# Patient Record
Sex: FEMALE | Race: WHITE | NOT HISPANIC OR LATINO | Employment: OTHER | URBAN - METROPOLITAN AREA
[De-identification: names, ages, dates, MRNs, and addresses within clinical notes are randomized per-mention and may not be internally consistent; named-entity substitution may affect disease eponyms.]

---

## 2019-04-01 ENCOUNTER — OFFICE VISIT CONVERTED (OUTPATIENT)
Dept: OTHER | Facility: HOSPITAL | Age: 84
End: 2019-04-01
Attending: NURSE PRACTITIONER

## 2019-04-01 ENCOUNTER — CONVERSION ENCOUNTER (OUTPATIENT)
Dept: OTHER | Facility: HOSPITAL | Age: 84
End: 2019-04-01

## 2019-04-01 ENCOUNTER — HOSPITAL ENCOUNTER (OUTPATIENT)
Dept: OTHER | Facility: HOSPITAL | Age: 84
Discharge: HOME | End: 2019-04-01
Attending: NURSE PRACTITIONER

## 2019-04-01 LAB
25(OH)D3 SERPL-MCNC: 34.7 NG/ML (ref 30–100)
ALBUMIN SERPL-MCNC: 4.4 G/DL (ref 3.5–5)
ALBUMIN/GLOB SERPL: 1.5 {RATIO} (ref 1.4–2.6)
ALP SERPL-CCNC: 60 U/L (ref 43–160)
ALT SERPL-CCNC: 11 U/L (ref 10–40)
ANION GAP SERPL CALC-SCNC: 16 MMOL/L (ref 8–19)
AST SERPL-CCNC: 19 U/L (ref 15–50)
BASOPHILS # BLD AUTO: 0.03 10*3/UL (ref 0–0.2)
BASOPHILS NFR BLD AUTO: 0.4 % (ref 0–3)
BILIRUB SERPL-MCNC: 0.58 MG/DL (ref 0.2–1.3)
BUN SERPL-MCNC: 14 MG/DL (ref 5–25)
BUN/CREAT SERPL: 16 {RATIO} (ref 6–20)
CALCIUM SERPL-MCNC: 9 MG/DL (ref 8.7–10.4)
CHLORIDE SERPL-SCNC: 104 MMOL/L (ref 99–111)
CHOLEST SERPL-MCNC: 169 MG/DL (ref 107–200)
CHOLEST/HDLC SERPL: 3.1 {RATIO} (ref 3–6)
CONV ABS IMM GRAN: 0.02 10*3/UL (ref 0–0.2)
CONV CO2: 26 MMOL/L (ref 22–32)
CONV IMMATURE GRAN: 0.3 % (ref 0–1.8)
CONV TOTAL PROTEIN: 7.4 G/DL (ref 6.3–8.2)
CREAT UR-MCNC: 0.88 MG/DL (ref 0.5–0.9)
DEPRECATED RDW RBC AUTO: 42 FL (ref 36.4–46.3)
EOSINOPHIL # BLD AUTO: 0.08 10*3/UL (ref 0–0.7)
EOSINOPHIL # BLD AUTO: 1.2 % (ref 0–7)
ERYTHROCYTE [DISTWIDTH] IN BLOOD BY AUTOMATED COUNT: 12.9 % (ref 11.7–14.4)
FOLATE SERPL-MCNC: >20 NG/ML (ref 4.8–20)
GFR SERPLBLD BASED ON 1.73 SQ M-ARVRAT: 59 ML/MIN/{1.73_M2}
GLOBULIN UR ELPH-MCNC: 3 G/DL (ref 2–3.5)
GLUCOSE SERPL-MCNC: 90 MG/DL (ref 65–99)
HBA1C MFR BLD: 14.2 G/DL (ref 12–16)
HCT VFR BLD AUTO: 44.9 % (ref 37–47)
HDLC SERPL-MCNC: 54 MG/DL (ref 40–60)
LDLC SERPL CALC-MCNC: 97 MG/DL (ref 70–100)
LYMPHOCYTES # BLD AUTO: 1.86 10*3/UL (ref 1–5)
MCH RBC QN AUTO: 28 PG (ref 27–31)
MCHC RBC AUTO-ENTMCNC: 31.6 G/DL (ref 33–37)
MCV RBC AUTO: 88.6 FL (ref 81–99)
MONOCYTES # BLD AUTO: 0.56 10*3/UL (ref 0.2–1.2)
MONOCYTES NFR BLD AUTO: 8.4 % (ref 3–10)
NEUTROPHILS # BLD AUTO: 4.14 10*3/UL (ref 2–8)
NEUTROPHILS NFR BLD AUTO: 61.9 % (ref 30–85)
NRBC CBCN: 0 % (ref 0–0.7)
OSMOLALITY SERPL CALC.SUM OF ELEC: 294 MOSM/KG (ref 273–304)
PLATELET # BLD AUTO: 106 10*3/UL (ref 130–400)
PMV BLD AUTO: 13.2 FL (ref 9.4–12.3)
POTASSIUM SERPL-SCNC: 4.2 MMOL/L (ref 3.5–5.3)
RBC # BLD AUTO: 5.07 10*6/UL (ref 4.2–5.4)
SODIUM SERPL-SCNC: 142 MMOL/L (ref 135–147)
TRIGL SERPL-MCNC: 88 MG/DL (ref 40–150)
TSH SERPL-ACNC: 1.54 M[IU]/L (ref 0.27–4.2)
VARIANT LYMPHS NFR BLD MANUAL: 27.8 % (ref 20–45)
VIT B12 SERPL-MCNC: 1569 PG/ML (ref 211–911)
VLDLC SERPL-MCNC: 18 MG/DL (ref 5–37)
WBC # BLD AUTO: 6.69 10*3/UL (ref 4.8–10.8)

## 2019-04-23 ENCOUNTER — OFFICE VISIT CONVERTED (OUTPATIENT)
Dept: OTHER | Facility: HOSPITAL | Age: 84
End: 2019-04-23
Attending: NURSE PRACTITIONER

## 2019-04-23 ENCOUNTER — CONVERSION ENCOUNTER (OUTPATIENT)
Dept: OTHER | Facility: HOSPITAL | Age: 84
End: 2019-04-23

## 2019-05-21 ENCOUNTER — OFFICE VISIT CONVERTED (OUTPATIENT)
Dept: OTHER | Facility: HOSPITAL | Age: 84
End: 2019-05-21
Attending: NURSE PRACTITIONER

## 2019-05-21 ENCOUNTER — CONVERSION ENCOUNTER (OUTPATIENT)
Dept: OTHER | Facility: HOSPITAL | Age: 84
End: 2019-05-21

## 2021-05-15 VITALS
RESPIRATION RATE: 16 BRPM | SYSTOLIC BLOOD PRESSURE: 151 MMHG | TEMPERATURE: 98 F | BODY MASS INDEX: 23.51 KG/M2 | WEIGHT: 104.5 LBS | HEIGHT: 56 IN | DIASTOLIC BLOOD PRESSURE: 73 MMHG | HEART RATE: 90 BPM | OXYGEN SATURATION: 98 %

## 2021-05-15 VITALS
BODY MASS INDEX: 23.9 KG/M2 | SYSTOLIC BLOOD PRESSURE: 151 MMHG | WEIGHT: 106.25 LBS | HEIGHT: 56 IN | OXYGEN SATURATION: 99 % | RESPIRATION RATE: 16 BRPM | TEMPERATURE: 97.9 F | HEART RATE: 70 BPM | DIASTOLIC BLOOD PRESSURE: 74 MMHG

## 2021-05-15 VITALS
RESPIRATION RATE: 16 BRPM | TEMPERATURE: 98.5 F | BODY MASS INDEX: 23.94 KG/M2 | OXYGEN SATURATION: 95 % | WEIGHT: 106.44 LBS | DIASTOLIC BLOOD PRESSURE: 80 MMHG | SYSTOLIC BLOOD PRESSURE: 186 MMHG | HEIGHT: 56 IN | HEART RATE: 84 BPM

## 2022-12-21 ENCOUNTER — HOSPITAL ENCOUNTER (EMERGENCY)
Facility: HOSPITAL | Age: 87
Discharge: HOME | End: 2022-12-21
Attending: EMERGENCY MEDICINE | Admitting: EMERGENCY MEDICINE

## 2022-12-21 ENCOUNTER — APPOINTMENT (OUTPATIENT)
Dept: CARDIOLOGY | Facility: HOSPITAL | Age: 87
End: 2022-12-21

## 2022-12-21 VITALS
OXYGEN SATURATION: 100 % | SYSTOLIC BLOOD PRESSURE: 170 MMHG | WEIGHT: 87.52 LBS | BODY MASS INDEX: 17.64 KG/M2 | TEMPERATURE: 98 F | DIASTOLIC BLOOD PRESSURE: 60 MMHG | HEIGHT: 59 IN | HEART RATE: 79 BPM | RESPIRATION RATE: 14 BRPM

## 2022-12-21 DIAGNOSIS — L03.116 CELLULITIS OF LEFT LEG: Primary | ICD-10-CM

## 2022-12-21 LAB
ALBUMIN SERPL-MCNC: 4.6 G/DL (ref 3.5–5.2)
ALBUMIN/GLOB SERPL: 1.6 G/DL
ALP SERPL-CCNC: 86 U/L (ref 39–117)
ALT SERPL W P-5'-P-CCNC: 7 U/L (ref 1–33)
ANION GAP SERPL CALCULATED.3IONS-SCNC: 11.7 MMOL/L (ref 5–15)
AST SERPL-CCNC: 17 U/L (ref 1–32)
BASOPHILS # BLD AUTO: 0.02 10*3/MM3 (ref 0–0.2)
BASOPHILS NFR BLD AUTO: 0.3 % (ref 0–1.5)
BH CV LOWER VASCULAR LEFT COMMON FEMORAL AUGMENT: NORMAL
BH CV LOWER VASCULAR LEFT COMMON FEMORAL COMPETENT: NORMAL
BH CV LOWER VASCULAR LEFT COMMON FEMORAL COMPRESS: NORMAL
BH CV LOWER VASCULAR LEFT COMMON FEMORAL PHASIC: NORMAL
BH CV LOWER VASCULAR LEFT COMMON FEMORAL SPONT: NORMAL
BH CV LOWER VASCULAR LEFT DISTAL FEMORAL AUGMENT: NORMAL
BH CV LOWER VASCULAR LEFT DISTAL FEMORAL COMPETENT: NORMAL
BH CV LOWER VASCULAR LEFT DISTAL FEMORAL COMPRESS: NORMAL
BH CV LOWER VASCULAR LEFT DISTAL FEMORAL PHASIC: NORMAL
BH CV LOWER VASCULAR LEFT DISTAL FEMORAL SPONT: NORMAL
BH CV LOWER VASCULAR LEFT GASTRONEMIUS COMPRESS: NORMAL
BH CV LOWER VASCULAR LEFT GREATER SAPH AK COMPRESS: NORMAL
BH CV LOWER VASCULAR LEFT GREATER SAPH BK COMPRESS: NORMAL
BH CV LOWER VASCULAR LEFT LESSER SAPH COMPRESS: NORMAL
BH CV LOWER VASCULAR LEFT MID FEMORAL COMPRESS: NORMAL
BH CV LOWER VASCULAR LEFT PERONEAL COMPRESS: NORMAL
BH CV LOWER VASCULAR LEFT POPLITEAL AUGMENT: NORMAL
BH CV LOWER VASCULAR LEFT POPLITEAL COMPETENT: NORMAL
BH CV LOWER VASCULAR LEFT POPLITEAL COMPRESS: NORMAL
BH CV LOWER VASCULAR LEFT POPLITEAL PHASIC: NORMAL
BH CV LOWER VASCULAR LEFT POPLITEAL SPONT: NORMAL
BH CV LOWER VASCULAR LEFT POSTERIOR TIBIAL AUGMENT: NORMAL
BH CV LOWER VASCULAR LEFT POSTERIOR TIBIAL COMPRESS: NORMAL
BH CV LOWER VASCULAR LEFT PROXIMAL FEMORAL COMPRESS: NORMAL
BH CV LOWER VASCULAR RIGHT COMMON FEMORAL AUGMENT: NORMAL
BH CV LOWER VASCULAR RIGHT COMMON FEMORAL COMPETENT: NORMAL
BH CV LOWER VASCULAR RIGHT COMMON FEMORAL COMPRESS: NORMAL
BH CV LOWER VASCULAR RIGHT COMMON FEMORAL PHASIC: NORMAL
BH CV LOWER VASCULAR RIGHT COMMON FEMORAL SPONT: NORMAL
BH CV VAS PRELIMINARY FINDINGS SCRIPTING: 1
BILIRUB SERPL-MCNC: 0.3 MG/DL (ref 0–1.2)
BUN SERPL-MCNC: 20 MG/DL (ref 8–23)
BUN/CREAT SERPL: 17.5 (ref 7–25)
CALCIUM SPEC-SCNC: 9.4 MG/DL (ref 8.6–10.5)
CHLORIDE SERPL-SCNC: 106 MMOL/L (ref 98–107)
CO2 SERPL-SCNC: 24.3 MMOL/L (ref 22–29)
CREAT SERPL-MCNC: 1.14 MG/DL (ref 0.57–1)
DEPRECATED RDW RBC AUTO: 43.2 FL (ref 37–54)
EGFRCR SERPLBLD CKD-EPI 2021: 46.1 ML/MIN/1.73
EOSINOPHIL # BLD AUTO: 0.13 10*3/MM3 (ref 0–0.4)
EOSINOPHIL NFR BLD AUTO: 2.3 % (ref 0.3–6.2)
ERYTHROCYTE [DISTWIDTH] IN BLOOD BY AUTOMATED COUNT: 13.2 % (ref 12.3–15.4)
GLOBULIN UR ELPH-MCNC: 2.9 GM/DL
GLUCOSE SERPL-MCNC: 113 MG/DL (ref 65–99)
HCT VFR BLD AUTO: 42.7 % (ref 34–46.6)
HGB BLD-MCNC: 13.9 G/DL (ref 12–15.9)
HOLD SPECIMEN: NORMAL
HOLD SPECIMEN: NORMAL
IMM GRANULOCYTES # BLD AUTO: 0.01 10*3/MM3 (ref 0–0.05)
IMM GRANULOCYTES NFR BLD AUTO: 0.2 % (ref 0–0.5)
LYMPHOCYTES # BLD AUTO: 1.46 10*3/MM3 (ref 0.7–3.1)
LYMPHOCYTES NFR BLD AUTO: 25.3 % (ref 19.6–45.3)
MAXIMAL PREDICTED HEART RATE: 131 BPM
MCH RBC QN AUTO: 28.7 PG (ref 26.6–33)
MCHC RBC AUTO-ENTMCNC: 32.6 G/DL (ref 31.5–35.7)
MCV RBC AUTO: 88.2 FL (ref 79–97)
MONOCYTES # BLD AUTO: 0.37 10*3/MM3 (ref 0.1–0.9)
MONOCYTES NFR BLD AUTO: 6.4 % (ref 5–12)
NEUTROPHILS NFR BLD AUTO: 3.78 10*3/MM3 (ref 1.7–7)
NEUTROPHILS NFR BLD AUTO: 65.5 % (ref 42.7–76)
NRBC BLD AUTO-RTO: 0 /100 WBC (ref 0–0.2)
PLATELET # BLD AUTO: 150 10*3/MM3 (ref 140–450)
PMV BLD AUTO: 11.7 FL (ref 6–12)
POTASSIUM SERPL-SCNC: 4.6 MMOL/L (ref 3.5–5.2)
PROT SERPL-MCNC: 7.5 G/DL (ref 6–8.5)
RBC # BLD AUTO: 4.84 10*6/MM3 (ref 3.77–5.28)
SODIUM SERPL-SCNC: 142 MMOL/L (ref 136–145)
STRESS TARGET HR: 111 BPM
WBC NRBC COR # BLD: 5.77 10*3/MM3 (ref 3.4–10.8)
WHOLE BLOOD HOLD COAG: NORMAL
WHOLE BLOOD HOLD SPECIMEN: NORMAL

## 2022-12-21 PROCEDURE — 25010000002 CEFAZOLIN 1-4 GM/50ML-% SOLUTION: Performed by: EMERGENCY MEDICINE

## 2022-12-21 PROCEDURE — 99283 EMERGENCY DEPT VISIT LOW MDM: CPT

## 2022-12-21 PROCEDURE — 96365 THER/PROPH/DIAG IV INF INIT: CPT

## 2022-12-21 PROCEDURE — 80053 COMPREHEN METABOLIC PANEL: CPT | Performed by: EMERGENCY MEDICINE

## 2022-12-21 PROCEDURE — 85025 COMPLETE CBC W/AUTO DIFF WBC: CPT | Performed by: EMERGENCY MEDICINE

## 2022-12-21 PROCEDURE — 93971 EXTREMITY STUDY: CPT

## 2022-12-21 PROCEDURE — 93971 EXTREMITY STUDY: CPT | Performed by: SURGERY

## 2022-12-21 PROCEDURE — 87040 BLOOD CULTURE FOR BACTERIA: CPT | Performed by: EMERGENCY MEDICINE

## 2022-12-21 RX ORDER — CEPHALEXIN 500 MG/1
500 CAPSULE ORAL 3 TIMES DAILY
Qty: 21 CAPSULE | Refills: 0 | Status: SHIPPED | OUTPATIENT
Start: 2022-12-21

## 2022-12-21 RX ORDER — OMEPRAZOLE 20 MG/1
20 CAPSULE, DELAYED RELEASE ORAL DAILY
COMMUNITY

## 2022-12-21 RX ORDER — CEFAZOLIN SODIUM 1 G/50ML
1 INJECTION, SOLUTION INTRAVENOUS ONCE
Status: COMPLETED | OUTPATIENT
Start: 2022-12-21 | End: 2022-12-21

## 2022-12-21 RX ORDER — SODIUM CHLORIDE 0.9 % (FLUSH) 0.9 %
10 SYRINGE (ML) INJECTION AS NEEDED
Status: DISCONTINUED | OUTPATIENT
Start: 2022-12-21 | End: 2022-12-21 | Stop reason: HOSPADM

## 2022-12-21 RX ADMIN — CEFAZOLIN SODIUM 1 G: 1 INJECTION, SOLUTION INTRAVENOUS at 20:01

## 2022-12-21 NOTE — ED PROVIDER NOTES
"Time: 5:09 PM EST  Arrived by: private car  Chief Complaint: Leg Swelling   History provided by: spouse  History is limited by: N/A     History of Present Illness:  Patient is a 89 y.o. year old female who presents to the emergency department with concerns for left leg infection.    Patient's  states the patient has been having problems with her left leg for 3 years, but it has been much worsened in the past 10 days. They have tried Epsom salt soaks and applying Neosporin at home.               Patient Care Team  Primary Care Provider: Provider, No Known    Past Medical History:     No Known Allergies  Past Medical History:   Diagnosis Date   • GERD (gastroesophageal reflux disease)      History reviewed. No pertinent surgical history.  History reviewed. No pertinent family history.    Home Medications:  Prior to Admission medications    Medication Sig Start Date End Date Taking? Authorizing Provider   omeprazole (priLOSEC) 20 MG capsule Take 20 mg by mouth Daily.   Yes Provider, MD Shania        Social History:   Social History     Tobacco Use   • Smoking status: Never   • Smokeless tobacco: Never   Substance Use Topics   • Alcohol use: Never   • Drug use: Never     Recent travel: no     Review of Systems:  Review of Systems     Physical Exam:  /60   Pulse 68   Temp 98 °F (36.7 °C) (Oral)   Resp 14   Ht 149.9 cm (59\")   Wt 39.7 kg (87 lb 8.4 oz)   SpO2 98%   BMI 17.68 kg/m²     Physical Exam  Vitals and nursing note reviewed.   Constitutional:       General: She is not in acute distress.  HENT:      Head: Normocephalic and atraumatic.   Eyes:      Extraocular Movements: Extraocular movements intact.   Cardiovascular:      Rate and Rhythm: Normal rate and regular rhythm.   Pulmonary:      Effort: Pulmonary effort is normal. No respiratory distress.      Breath sounds: Normal breath sounds.   Abdominal:      General: Abdomen is flat.      Palpations: Abdomen is soft.      Tenderness: There is " no abdominal tenderness.   Musculoskeletal:         General: Normal range of motion.      Cervical back: Normal range of motion and neck supple.   Skin:     General: Skin is warm and dry.      Capillary Refill: Capillary refill takes less than 2 seconds.      Comments: erythema and edema left lateral and medial aspect of the leg  mild tenderness to touch  sensation is intact     Neurological:      Mental Status: She is alert and oriented to person, place, and time. Mental status is at baseline.                Medications in the Emergency Department:  Medications   sodium chloride 0.9 % flush 10 mL (has no administration in time range)   ceFAZolin (ANCEF) IVPB 1 g (1 g Intravenous New Bag 12/21/22 2001)        Labs  Lab Results (last 24 hours)     Procedure Component Value Units Date/Time    CBC & Differential [[id number]]  (Normal) Collected: 12/21/22 1736    Specimen: Blood Updated: 12/21/22 1747    Narrative:      The following orders were created for panel order CBC & Differential.  Procedure                               Abnormality         Status                     ---------                               -----------         ------                     CBC Auto Differential[[id number]]        Normal              Final result                 Please view results for these tests on the individual orders.    Comprehensive Metabolic Panel [[id number]]  (Abnormal) Collected: 12/21/22 1736    Specimen: Blood Updated: 12/21/22 1810     Glucose 113 mg/dL      BUN 20 mg/dL      Creatinine 1.14 mg/dL      Sodium 142 mmol/L      Potassium 4.6 mmol/L      Chloride 106 mmol/L      CO2 24.3 mmol/L      Calcium 9.4 mg/dL      Total Protein 7.5 g/dL      Albumin 4.60 g/dL      ALT (SGPT) 7 U/L      AST (SGOT) 17 U/L      Alkaline Phosphatase 86 U/L      Total Bilirubin 0.3 mg/dL      Globulin 2.9 gm/dL      A/G Ratio 1.6 g/dL      BUN/Creatinine Ratio 17.5     Anion Gap 11.7 mmol/L      eGFR 46.1 mL/min/1.73      Comment: National  Kidney Foundation and American Society of Nephrology (ASN) Task Force recommended calculation based on the Chronic Kidney Disease Epidemiology Collaboration (CKD-EPI) equation refit without adjustment for race.       Narrative:      GFR Normal >60  Chronic Kidney Disease <60  Kidney Failure <15    The GFR formula is only valid for adults with stable renal function between ages 18 and 70.    CBC Auto Differential [[id number]]  (Normal) Collected: 12/21/22 1736    Specimen: Blood Updated: 12/21/22 1747     WBC 5.77 10*3/mm3      RBC 4.84 10*6/mm3      Hemoglobin 13.9 g/dL      Hematocrit 42.7 %      MCV 88.2 fL      MCH 28.7 pg      MCHC 32.6 g/dL      RDW 13.2 %      RDW-SD 43.2 fl      MPV 11.7 fL      Platelets 150 10*3/mm3      Neutrophil % 65.5 %      Lymphocyte % 25.3 %      Monocyte % 6.4 %      Eosinophil % 2.3 %      Basophil % 0.3 %      Immature Grans % 0.2 %      Neutrophils, Absolute 3.78 10*3/mm3      Lymphocytes, Absolute 1.46 10*3/mm3      Monocytes, Absolute 0.37 10*3/mm3      Eosinophils, Absolute 0.13 10*3/mm3      Basophils, Absolute 0.02 10*3/mm3      Immature Grans, Absolute 0.01 10*3/mm3      nRBC 0.0 /100 WBC     Blood Culture - Blood, Arm, Left [[id number]] Collected: 12/21/22 1736    Specimen: Blood from Arm, Left Updated: 12/21/22 1740    Blood Culture - Blood, Arm, Left [[id number]] Collected: 12/21/22 1816    Specimen: Blood from Arm, Left Updated: 12/21/22 1826           Imaging:  Duplex Venous Lower Extremity - Left CAR    Result Date: 12/21/2022  •  Normal left lower extremity venous duplex scan.       Procedures:  Procedures    Progress                            Medical Decision Making:  MDM  Number of Diagnoses or Management Options  Patient Progress  Patient progress: improved (19:42 EST: Patient rechecked. Updated the patient on plan for discharge. Counseled on signs of worsening condition, RTER precautions, and out-patient follow up as needed. Patient expressed agreement and  understanding. All questions have been answered at this time.)     89-year-old female patient presents with left leg redness which is worsening over the last couple of days.  Patient's ultrasound was negative for DVT.  The patient has no fever and white count is normal.  Exam is consistent with a cellulitis.  Patient received IV antibiotics in the emergency department is stable for discharge on oral antibiotics with recommended outpatient follow-up.        Final diagnoses:   Cellulitis of left leg        Disposition:  ED Disposition     ED Disposition   Discharge    Condition   Stable    Comment   --             This medical record created using voice recognition software.         Lynette Tilley  12/21/22 1713       Lynette Tilley  12/21/22 1729       Lynette Tilley  12/21/22 1942       Cayetano Macias DO  12/21/22 2005

## 2022-12-22 NOTE — DISCHARGE INSTRUCTIONS
Stay well-hydrated and make a follow-up appointment if possible for this week and if not first part of next week with your primary care provider to reevaluate your left leg.  There was no evidence for a DVT/blood clot in your leg based on ultrasound.

## 2022-12-26 LAB
BACTERIA SPEC AEROBE CULT: NORMAL
BACTERIA SPEC AEROBE CULT: NORMAL

## 2023-08-12 ENCOUNTER — APPOINTMENT (OUTPATIENT)
Dept: GENERAL RADIOLOGY | Facility: HOSPITAL | Age: 88
DRG: 552 | End: 2023-08-12
Payer: MEDICARE

## 2023-08-12 ENCOUNTER — HOSPITAL ENCOUNTER (INPATIENT)
Facility: HOSPITAL | Age: 88
LOS: 2 days | Discharge: HOME | DRG: 552 | End: 2023-08-15
Attending: EMERGENCY MEDICINE | Admitting: INTERNAL MEDICINE
Payer: MEDICARE

## 2023-08-12 ENCOUNTER — APPOINTMENT (OUTPATIENT)
Dept: CT IMAGING | Facility: HOSPITAL | Age: 88
DRG: 552 | End: 2023-08-12
Payer: MEDICARE

## 2023-08-12 DIAGNOSIS — R26.2 DIFFICULTY WALKING: ICD-10-CM

## 2023-08-12 DIAGNOSIS — N39.0 ACUTE UTI: ICD-10-CM

## 2023-08-12 DIAGNOSIS — R53.1 WEAKNESS: Primary | ICD-10-CM

## 2023-08-12 DIAGNOSIS — S32.000A COMPRESSION FRACTURE OF LUMBAR VERTEBRA, UNSPECIFIED LUMBAR VERTEBRAL LEVEL, INITIAL ENCOUNTER: ICD-10-CM

## 2023-08-12 DIAGNOSIS — M53.2X6 SPINAL INSTABILITIES OF LUMBAR REGION: ICD-10-CM

## 2023-08-12 DIAGNOSIS — R26.2 UNABLE TO AMBULATE: ICD-10-CM
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
## 2023-08-12 LAB
ALBUMIN SERPL-MCNC: 4.3 G/DL (ref 3.5–5.2)
ALBUMIN/GLOB SERPL: 1.7 G/DL
ALP SERPL-CCNC: 60 U/L (ref 39–117)
ALT SERPL W P-5'-P-CCNC: 14 U/L (ref 1–33)
ANION GAP SERPL CALCULATED.3IONS-SCNC: 10.9 MMOL/L (ref 5–15)
AST SERPL-CCNC: 17 U/L (ref 1–32)
BACTERIA UR QL AUTO: ABNORMAL /HPF
BASOPHILS # BLD AUTO: 0.02 10*3/MM3 (ref 0–0.2)
BASOPHILS NFR BLD AUTO: 0.2 % (ref 0–1.5)
BILIRUB SERPL-MCNC: 0.8 MG/DL (ref 0–1.2)
BILIRUB UR QL STRIP: NEGATIVE
BUN SERPL-MCNC: 14 MG/DL (ref 8–23)
BUN/CREAT SERPL: 11.5 (ref 7–25)
CALCIUM SPEC-SCNC: 8.9 MG/DL (ref 8.2–9.6)
CHLORIDE SERPL-SCNC: 104 MMOL/L (ref 98–107)
CLARITY UR: CLEAR
CO2 SERPL-SCNC: 24.1 MMOL/L (ref 22–29)
COLOR UR: YELLOW
CREAT SERPL-MCNC: 1.22 MG/DL (ref 0.57–1)
DEPRECATED RDW RBC AUTO: 44.2 FL (ref 37–54)
EGFRCR SERPLBLD CKD-EPI 2021: 42.2 ML/MIN/1.73
EOSINOPHIL # BLD AUTO: 0.01 10*3/MM3 (ref 0–0.4)
EOSINOPHIL NFR BLD AUTO: 0.1 % (ref 0.3–6.2)
ERYTHROCYTE [DISTWIDTH] IN BLOOD BY AUTOMATED COUNT: 13.7 % (ref 12.3–15.4)
GLOBULIN UR ELPH-MCNC: 2.5 GM/DL
GLUCOSE SERPL-MCNC: 105 MG/DL (ref 65–99)
GLUCOSE UR STRIP-MCNC: NEGATIVE MG/DL
HCT VFR BLD AUTO: 45.9 % (ref 34–46.6)
HGB BLD-MCNC: 15.1 G/DL (ref 12–15.9)
HGB UR QL STRIP.AUTO: NEGATIVE
HOLD SPECIMEN: NORMAL
HYALINE CASTS UR QL AUTO: ABNORMAL /LPF
IMM GRANULOCYTES # BLD AUTO: 0.02 10*3/MM3 (ref 0–0.05)
IMM GRANULOCYTES NFR BLD AUTO: 0.2 % (ref 0–0.5)
KETONES UR QL STRIP: ABNORMAL
LEUKOCYTE ESTERASE UR QL STRIP.AUTO: ABNORMAL
LYMPHOCYTES # BLD AUTO: 0.85 10*3/MM3 (ref 0.7–3.1)
LYMPHOCYTES NFR BLD AUTO: 9.8 % (ref 19.6–45.3)
MCH RBC QN AUTO: 28.9 PG (ref 26.6–33)
MCHC RBC AUTO-ENTMCNC: 32.9 G/DL (ref 31.5–35.7)
MCV RBC AUTO: 87.8 FL (ref 79–97)
MONOCYTES # BLD AUTO: 0.66 10*3/MM3 (ref 0.1–0.9)
MONOCYTES NFR BLD AUTO: 7.6 % (ref 5–12)
NEUTROPHILS NFR BLD AUTO: 7.14 10*3/MM3 (ref 1.7–7)
NEUTROPHILS NFR BLD AUTO: 82.1 % (ref 42.7–76)
NITRITE UR QL STRIP: NEGATIVE
NRBC BLD AUTO-RTO: 0 /100 WBC (ref 0–0.2)
PH UR STRIP.AUTO: 7.5 [PH] (ref 5–8)
PLATELET # BLD AUTO: 130 10*3/MM3 (ref 140–450)
PMV BLD AUTO: 12.1 FL (ref 6–12)
POTASSIUM SERPL-SCNC: 4 MMOL/L (ref 3.5–5.2)
PROT SERPL-MCNC: 6.8 G/DL (ref 6–8.5)
PROT UR QL STRIP: NEGATIVE
RBC # BLD AUTO: 5.23 10*6/MM3 (ref 3.77–5.28)
RBC # UR STRIP: ABNORMAL /HPF
REF LAB TEST METHOD: ABNORMAL
SODIUM SERPL-SCNC: 139 MMOL/L (ref 136–145)
SP GR UR STRIP: 1.01 (ref 1–1.03)
SQUAMOUS #/AREA URNS HPF: ABNORMAL /HPF
UROBILINOGEN UR QL STRIP: ABNORMAL
WBC # UR STRIP: ABNORMAL /HPF
WBC NRBC COR # BLD: 8.7 10*3/MM3 (ref 3.4–10.8)
WHOLE BLOOD HOLD COAG: NORMAL

## 2023-08-12 PROCEDURE — 81001 URINALYSIS AUTO W/SCOPE: CPT | Performed by: EMERGENCY MEDICINE

## 2023-08-12 PROCEDURE — 85025 COMPLETE CBC W/AUTO DIFF WBC: CPT | Performed by: EMERGENCY MEDICINE

## 2023-08-12 PROCEDURE — 70450 CT HEAD/BRAIN W/O DYE: CPT

## 2023-08-12 PROCEDURE — 25010000002 CEFTRIAXONE PER 250 MG: Performed by: EMERGENCY MEDICINE

## 2023-08-12 PROCEDURE — 73502 X-RAY EXAM HIP UNI 2-3 VIEWS: CPT

## 2023-08-12 PROCEDURE — 72192 CT PELVIS W/O DYE: CPT

## 2023-08-12 PROCEDURE — 87086 URINE CULTURE/COLONY COUNT: CPT | Performed by: EMERGENCY MEDICINE

## 2023-08-12 PROCEDURE — 72100 X-RAY EXAM L-S SPINE 2/3 VWS: CPT

## 2023-08-12 PROCEDURE — 80053 COMPREHEN METABOLIC PANEL: CPT | Performed by: EMERGENCY MEDICINE

## 2023-08-12 PROCEDURE — 99285 EMERGENCY DEPT VISIT HI MDM: CPT

## 2023-08-12 RX ORDER — CEFTRIAXONE SODIUM 1 G/50ML
1000 INJECTION, SOLUTION INTRAVENOUS ONCE
Status: COMPLETED | OUTPATIENT
Start: 2023-08-12 | End: 2023-08-13

## 2023-08-12 RX ORDER — ACETAMINOPHEN 325 MG/1
650 TABLET ORAL ONCE
Status: COMPLETED | OUTPATIENT
Start: 2023-08-12 | End: 2023-08-12

## 2023-08-12 RX ADMIN — CEFTRIAXONE SODIUM 1000 MG: 1 INJECTION, SOLUTION INTRAVENOUS at 23:14

## 2023-08-12 RX ADMIN — ACETAMINOPHEN 650 MG: 325 TABLET ORAL at 21:49

## 2023-08-12 RX ADMIN — SODIUM CHLORIDE 500 ML: 9 INJECTION, SOLUTION INTRAVENOUS at 23:14

## 2023-08-12 NOTE — ED PROVIDER NOTES
Time: 7:52 PM EDT  Date of encounter:  8/12/2023  Independent Historian/Clinical History and Information was obtained by:   Patient and Family    History is limited by: Dementia    Chief Complaint   Patient presents with    Fall    Hip Pain         History of Present Illness:  Patient is a 89 y.o. year old female who presents to the emergency department for evaluation of a fall that occurred yesterday.  Patient's  states he found her on the kitchen floor and she had been down for 5 to 10 minutes.  Patient states she is had headache since the fall. Patient was seen by provider in triage, Devin Lopez PA-C    Patient's  states that she is normally able to ambulate without assistance but he found her on the floor this evening and she has been unable to bear weight since.  She complains of pain over her left hip.  She denies any back pain at this time.  She complains of mild headache.      HPI    Patient Care Team  Primary Care Provider: Provider, No Known    Past Medical History:     No Known Allergies  Past Medical History:   Diagnosis Date    GERD (gastroesophageal reflux disease)      History reviewed. No pertinent surgical history.  History reviewed. No pertinent family history.    Home Medications:  Prior to Admission medications    Medication Sig Start Date End Date Taking? Authorizing Provider   cephalexin (KEFLEX) 500 MG capsule Take 1 capsule by mouth 3 (Three) Times a Day. 12/21/22   Cayetano Macias,    omeprazole (priLOSEC) 20 MG capsule Take 20 mg by mouth Daily.    Provider, Shania, MD        Social History:   Social History     Tobacco Use    Smoking status: Never     Passive exposure: Never    Smokeless tobacco: Never   Vaping Use    Vaping Use: Never used   Substance Use Topics    Alcohol use: Never    Drug use: Never         Review of Systems:  Review of Systems   Constitutional:  Negative for chills and fever.   HENT:  Negative for congestion, ear pain and sore throat.    Eyes:   "Negative for pain.   Respiratory:  Negative for cough, chest tightness and shortness of breath.    Cardiovascular:  Negative for chest pain.   Gastrointestinal:  Negative for abdominal pain, diarrhea, nausea and vomiting.   Genitourinary:  Negative for flank pain and hematuria.   Musculoskeletal:  Positive for arthralgias and back pain. Negative for joint swelling.   Skin:  Negative for pallor.   Neurological:  Positive for headaches. Negative for seizures.   All other systems reviewed and are negative.     Physical Exam:  /85 (BP Location: Left arm, Patient Position: Lying)   Pulse 72   Temp 97.3 øF (36.3 øC) (Oral)   Resp 16   Ht 157.5 cm (62\")   Wt 37.1 kg (81 lb 11.2 oz)   SpO2 99%   BMI 14.94 kg/mý         Physical Exam  Vitals and nursing note reviewed.   Constitutional:       General: She is not in acute distress.     Appearance: Normal appearance. She is not toxic-appearing.      Comments: Thin and frail   HENT:      Head: Normocephalic and atraumatic.      Jaw: There is normal jaw occlusion.      Mouth/Throat:      Mouth: Mucous membranes are moist.   Eyes:      General: Lids are normal.      Extraocular Movements: Extraocular movements intact.      Conjunctiva/sclera: Conjunctivae normal.      Pupils: Pupils are equal, round, and reactive to light.   Cardiovascular:      Rate and Rhythm: Normal rate and regular rhythm.      Pulses: Normal pulses.      Heart sounds: Normal heart sounds.   Pulmonary:      Effort: Pulmonary effort is normal. No respiratory distress.      Breath sounds: Normal breath sounds. No wheezing or rhonchi.   Abdominal:      General: Abdomen is flat. There is no distension.      Palpations: Abdomen is soft.      Tenderness: There is no abdominal tenderness. There is no guarding or rebound.   Musculoskeletal:         General: Tenderness (Anterior aspect left hip) present. Normal range of motion.      Cervical back: Normal range of motion and neck supple.      Right lower " leg: No edema.      Left lower leg: No edema.   Skin:     General: Skin is warm and dry.   Neurological:      General: No focal deficit present.      Mental Status: She is alert and oriented to person, place, and time. Mental status is at baseline.   Psychiatric:         Mood and Affect: Mood normal.         Behavior: Behavior normal.             Procedures:  Procedures      Medical Decision Making:      Comorbidities that affect care:    GERD , hypertension, hyperlipidemia, osteoporosis    External Notes reviewed:    Previous Clinic Note: PCP visit 2019      The following orders were placed and all results were independently analyzed by me:  Orders Placed This Encounter   Procedures    Urine Culture - Urine,    XR Hip With or Without Pelvis 2 - 3 View Left    XR Spine Lumbar 2 or 3 View    CT Head Without Contrast    CT Pelvis Without Contrast    Comprehensive Metabolic Panel    Urinalysis With Culture If Indicated - Urine, Clean Catch    CBC Auto Differential    [place] Draw    Urinalysis, Microscopic Only - Urine, Clean Catch    Diet: Regular/House Diet; Texture: Regular Texture (IDDSI 7); Fluid Consistency: Thin (IDDSI 0)    Vital Signs    Intake & Output    Weigh Patient    Oral Care    Saline Lock & Maintain IV Access    Activity - Strict Bed Rest    Code Status and Medical Interventions:    Hospitalist (on-call MD unless specified)    Inpatient Case Management  Consult    OT Consult: Eval & Treat    PT Consult: Eval & Treat Functional Mobility Below Baseline, Discharge Placement Assessment    Insert Peripheral IV    Initiate Observation Status    Fall Precautions    CBC & Differential    Gold Top - SST    Light Blue Top       Medications Given in the Emergency Department:  Medications   pantoprazole (PROTONIX) EC tablet 40 mg (40 mg Oral Given 8/13/23 0511)   sodium chloride 0.9 % flush 10 mL (10 mL Intravenous Not Given 8/13/23 0050)   sodium chloride 0.9 % flush 10 mL (has no administration  in time range)   sodium chloride 0.9 % infusion 40 mL (has no administration in time range)   sennosides-docusate (PERICOLACE) 8.6-50 MG per tablet 2 tablet (has no administration in time range)     And   polyethylene glycol (MIRALAX) packet 17 g (has no administration in time range)     And   bisacodyl (DULCOLAX) EC tablet 5 mg (has no administration in time range)     And   bisacodyl (DULCOLAX) suppository 10 mg (has no administration in time range)   heparin (porcine) 5000 UNIT/ML injection 5,000 Units (has no administration in time range)   acetaminophen (TYLENOL) tablet 650 mg (has no administration in time range)   traMADol (ULTRAM) tablet 50 mg (has no administration in time range)   cefTRIAXone (ROCEPHIN) IVPB 1,000 mg (has no administration in time range)   acetaminophen (TYLENOL) tablet 650 mg (650 mg Oral Given 8/12/23 2149)   sodium chloride 0.9 % bolus 500 mL (0 mL Intravenous Stopped 8/13/23 0049)   cefTRIAXone (ROCEPHIN) IVPB 1,000 mg (0 mg Intravenous Stopped 8/13/23 0049)        ED Course:    The patient was initially evaluated in the triage area where orders were placed. The patient was later dispositioned by Ananda Chapman MD.      The patient was advised to stay for completion of workup which includes but is not limited to communication of labs and radiological results, reassessment and plan. The patient was advised that leaving prior to disposition by a provider could result in critical findings that are not communicated to the patient.     ED Course as of 08/13/23 0705   Sat Aug 12, 2023   2000 Provider In Triage: Patient was evaluated by me in triage, Devin Lopez PA-C. Orders were placed and the patient is currently awaiting final results and disposition.   [SK]   213 X-ray of the left hip shows no evidence of fracture patient continues to have pain reduced range of motion and tenderness over left hip we will proceed to CT without contrast to ensure no occult fracture.  X-ray of the lumbar  spine shows multiple compression fractures which may be contributing to her pain. [JS]   2251 XR Hip With or Without Pelvis 2 - 3 View Left [SK]   2259 Patient was discussed with  for admission.  The patient's airway is intact, vital signs, and respiratory status are safe for admission at this time. [JS]      ED Course User Index  [JS] Ananda Chapman MD  [SK] Devin Lopez PA-C       Labs:    Lab Results (last 24 hours)       Procedure Component Value Units Date/Time    Urinalysis With Culture If Indicated - Urine, Clean Catch [[id number]]  (Abnormal) Collected: 08/12/23 2033    Specimen: Urine, Clean Catch Updated: 08/12/23 2047     Color, UA Yellow     Appearance, UA Clear     pH, UA 7.5     Specific Gravity, UA 1.012     Glucose, UA Negative     Ketones, UA Trace     Bilirubin, UA Negative     Blood, UA Negative     Protein, UA Negative     Leuk Esterase, UA Small (1+)     Nitrite, UA Negative     Urobilinogen, UA 1.0 E.U./dL    Narrative:      In absence of clinical symptoms, the presence of pyuria, bacteria, and/or nitrites on the urinalysis result does not correlate with infection.    Urinalysis, Microscopic Only - Urine, Clean Catch [[id number]]  (Abnormal) Collected: 08/12/23 2033    Specimen: Urine, Clean Catch Updated: 08/12/23 2047     RBC, UA 0-2 /HPF      WBC, UA 13-20 /HPF      Bacteria, UA None Seen /HPF      Squamous Epithelial Cells, UA 0-2 /HPF      Hyaline Casts, UA 0-2 /LPF      Methodology Automated Microscopy    Urine Culture - Urine, Urine, Clean Catch [[id number]] Collected: 08/12/23 2033    Specimen: Urine, Clean Catch Updated: 08/12/23 2047    CBC & Differential [[id number]]  (Abnormal) Collected: 08/12/23 2105    Specimen: Blood Updated: 08/12/23 2115    Narrative:      The following orders were created for panel order CBC & Differential.  Procedure                               Abnormality         Status                     ---------                               -----------          ------                     CBC Auto Differential[[id number]]        Abnormal            Final result                 Please view results for these tests on the individual orders.    Comprehensive Metabolic Panel [[id number]]  (Abnormal) Collected: 08/12/23 2105    Specimen: Blood Updated: 08/12/23 2133     Glucose 105 mg/dL      BUN 14 mg/dL      Creatinine 1.22 mg/dL      Sodium 139 mmol/L      Potassium 4.0 mmol/L      Chloride 104 mmol/L      CO2 24.1 mmol/L      Calcium 8.9 mg/dL      Total Protein 6.8 g/dL      Albumin 4.3 g/dL      ALT (SGPT) 14 U/L      AST (SGOT) 17 U/L      Alkaline Phosphatase 60 U/L      Total Bilirubin 0.8 mg/dL      Globulin 2.5 gm/dL      A/G Ratio 1.7 g/dL      BUN/Creatinine Ratio 11.5     Anion Gap 10.9 mmol/L      eGFR 42.2 mL/min/1.73     Narrative:      GFR Normal >60  Chronic Kidney Disease <60  Kidney Failure <15    The GFR formula is only valid for adults with stable renal function between ages 18 and 70.    CBC Auto Differential [[id number]]  (Abnormal) Collected: 08/12/23 2105    Specimen: Blood Updated: 08/12/23 2115     WBC 8.70 10*3/mm3      RBC 5.23 10*6/mm3      Hemoglobin 15.1 g/dL      Hematocrit 45.9 %      MCV 87.8 fL      MCH 28.9 pg      MCHC 32.9 g/dL      RDW 13.7 %      RDW-SD 44.2 fl      MPV 12.1 fL      Platelets 130 10*3/mm3      Neutrophil % 82.1 %      Lymphocyte % 9.8 %      Monocyte % 7.6 %      Eosinophil % 0.1 %      Basophil % 0.2 %      Immature Grans % 0.2 %      Neutrophils, Absolute 7.14 10*3/mm3      Lymphocytes, Absolute 0.85 10*3/mm3      Monocytes, Absolute 0.66 10*3/mm3      Eosinophils, Absolute 0.01 10*3/mm3      Basophils, Absolute 0.02 10*3/mm3      Immature Grans, Absolute 0.02 10*3/mm3      nRBC 0.0 /100 WBC              Imaging:    XR Spine Lumbar 2 or 3 View    Result Date: 8/12/2023  PROCEDURE: XR SPINE LUMBAR 2 OR 3 VW  COMPARISON: [facility], CR, LA-SPINE - AP & LAT, 12/29/2010, 13:53.  INDICATIONS: LOW BACK PAIN  POST FALL  FINDINGS:  There is dextroscoliosis at the thoracolumbar junction.  There is a moderate anterior wedge compression deformity at T12 that appears unchanged from prior exam.  There is a moderate anterior wedge compression deformity at T11 that is new from prior exam.  There is a mild superior endplate compression deformity at L4, and a moderate compression deformity at L5, which appears new from prior exam.  There is generalized osteopenia.  Mild discogenic changes are present throughout.  There is mild to moderate facet arthropathy at L2-3 through L5-S1.  The soft tissues are unremarkable.       Compression deformities at T11, L4, and L5, which appears new from prior exam from 2010, but are otherwise age-indeterminate.     SUMAYA PEPE MD       Electronically Signed and Approved By: SUMAYA PEPE MD on 8/12/2023 at 21:17             CT Head Without Contrast    Result Date: 8/12/2023  PROCEDURE: CT HEAD WO CONTRAST  COMPARISON:  None INDICATIONS: Fall yesterday with headache today  PROTOCOL:   Standard imaging protocol performed    RADIATION:   DLP: 1081.2mGy*cm   MA and/or KV was adjusted to minimize radiation dose.     TECHNIQUE: After obtaining the patient's consent, CT images were obtained without non-ionic intravenous contrast material.  FINDINGS:  There is no acute intracranial hemorrhage or extra-axial collection. The ventricles appear normal in caliber, with no evidence of mass effect or midline shift. The basal cisterns are patent. The gray-white differentiation is preserved.  The calvarium is intact.  There is mild mucosal disease in the right maxillary sinus. The mastoid air cells are well-aerated.  Scattered foci of periventricular and subcortical white matter hypodensities are nonspecific, but likely the sequela of mild chronic small vessel ischemic disease.  There is a prominent perivascular space in the left basal ganglia.  There is mild generalized parenchymal atrophy with prominence  of the extra-axial spaces.        1. No acute intracranial process or calvarial fracture identified. 2. Findings suggestive of mild chronic small vessel ischemic disease.     SUMAYA PEPE MD       Electronically Signed and Approved By: SUMAYA PEPE MD on 8/12/2023 at 21:27             CT Pelvis Without Contrast    Result Date: 8/12/2023  PROCEDURE: CT PELVIS WO CONTRAST  COMPARISON: [facility], CR, XR SPINE LUMBAR 2 OR 3 VW, 8/12/2023, 20:42.  [facility], CR, XR HIP W OR WO PELVIS 2-3 VIEW LEFT, 8/12/2023, 20:43.  INDICATIONS: L hip tenderness, reduced ROM, unable to bear weight  PROTOCOL:   Standard imaging protocol performed    RADIATION:   DLP: 358.8 mGy*cm   Automated exposure control was utilized to minimize radiation dose.  TECHNIQUE: After obtaining the patient's consent, CT images were created without intravenous contrast.  Multiplanar imaging was performed.  FINDINGS:  No acute fracture is identified.  There is generalized osteopenia.  The pelvic ring appears intact.  Moderate degenerative changes are present.  There are compression deformities in the visualized lower lumbar spine, which appear chronic.  The muscle bulk about the hip appears within normal limits.  The rectum and urinary bladder are unremarkable.  The uterus is surgically absent.       No acute fracture or traumatic malalignment.     SUMAYA PEPE MD       Electronically Signed and Approved By: SUMAYA PEPE MD on 8/12/2023 at 22:27             XR Hip With or Without Pelvis 2 - 3 View Left    Result Date: 8/12/2023  PROCEDURE: XR HIP W OR WO PELVIS 2-3 VIEW LEFT  COMPARISON: None  INDICATIONS: LEFT HIP PAIN POST FALL  FINDINGS:  No acute fracture is identified.  The pelvic ring appears intact.  Moderate degenerative changes are present.  The soft tissues are unremarkable.       No acute fracture or traumatic malalignment identified.      SUMAYA PEPE MD       Electronically Signed and Approved  By: SUMAYA PEPE MD on 8/12/2023 at 21:14                Differential Diagnosis and Discussion:      Extremity Pain: Differential diagnosis includes but is not limited to soft tissue sprain, tendonitis, tendon injury, dislocation, fracture, deep vein thrombosis, arterial insufficiency, osteoarthritis, bursitis, and ligamentous damage.  Weakness: Based on the patient's history, signs, and symptoms, the diffential diagnosis includes but is not limited to meningitis, stroke, sepsis, subarachnoid hemorrhage, intracranial bleeding, encephalitis, acute uti, dehydration, MS, myasthenia gravis, Guillan [place], migraine variant, neuromuscular disorders vertigo, electrolyte imbalance, and metabolic disorders.    All labs were reviewed and interpreted by me.  All X-rays impressions were independently interpreted by me.  CT scan radiology impression was interpreted by me.    MDM     Amount and/or Complexity of Data Reviewed  Decide to obtain previous medical records or to obtain history from someone other than the patient: yes             Patient Care Considerations:    NARCOTICS: I considered prescribing opiate pain medication as an outpatient, however patient's pain was controlled with acetaminophen      Consultants/Shared Management Plan:    Hospitalist: I have discussed the case with the hospitalist who agrees to accept the patient for admission.    Social Determinants of Health:    Patient has presented with family members who are responsible, reliable and will ensure follow up care.      Disposition and Care Coordination:    Admit:   Through independent evaluation of the patient's history, physical, and imperical data, the patient meets criteria for observation/admission to the hospital.        Final diagnoses:   Weakness   Unable to ambulate   Fall, initial encounter   Compression fracture of lumbar vertebra, unspecified lumbar vertebral level, initial encounter   Acute UTI        ED Disposition       ED Disposition    Decision to Admit    Condition   --    Comment   Level of Care: Med/Surg [1]   Diagnosis: Fall [[id number]]                 This medical record created using voice recognition software.             Ananda Chapman MD  08/13/23 0700

## 2023-08-13 PROCEDURE — 25010000002 HYDRALAZINE PER 20 MG: Performed by: PHYSICIAN ASSISTANT

## 2023-08-13 PROCEDURE — 25010000002 CEFTRIAXONE PER 250 MG: Performed by: STUDENT IN AN ORGANIZED HEALTH CARE EDUCATION/TRAINING PROGRAM

## 2023-08-13 PROCEDURE — 25010000002 HEPARIN (PORCINE) PER 1000 UNITS: Performed by: STUDENT IN AN ORGANIZED HEALTH CARE EDUCATION/TRAINING PROGRAM

## 2023-08-13 RX ORDER — ACETAMINOPHEN 325 MG/1
650 TABLET ORAL EVERY 4 HOURS PRN
Status: DISCONTINUED | OUTPATIENT
Start: 2023-08-13 | End: 2023-08-15 | Stop reason: HOSPADM

## 2023-08-13 RX ORDER — SODIUM CHLORIDE 0.9 % (FLUSH) 0.9 %
10 SYRINGE (ML) INJECTION EVERY 12 HOURS SCHEDULED
Status: DISCONTINUED | OUTPATIENT
Start: 2023-08-13 | End: 2023-08-15 | Stop reason: HOSPADM

## 2023-08-13 RX ORDER — HEPARIN SODIUM 5000 [USP'U]/ML
5000 INJECTION, SOLUTION INTRAVENOUS; SUBCUTANEOUS EVERY 12 HOURS SCHEDULED
Status: DISCONTINUED | OUTPATIENT
Start: 2023-08-13 | End: 2023-08-15 | Stop reason: HOSPADM

## 2023-08-13 RX ORDER — SODIUM CHLORIDE 0.9 % (FLUSH) 0.9 %
10 SYRINGE (ML) INJECTION AS NEEDED
Status: DISCONTINUED | OUTPATIENT
Start: 2023-08-13 | End: 2023-08-15 | Stop reason: HOSPADM

## 2023-08-13 RX ORDER — BISACODYL 10 MG
10 SUPPOSITORY, RECTAL RECTAL DAILY PRN
Status: DISCONTINUED | OUTPATIENT
Start: 2023-08-13 | End: 2023-08-15 | Stop reason: HOSPADM

## 2023-08-13 RX ORDER — POLYETHYLENE GLYCOL 3350 17 G/17G
17 POWDER, FOR SOLUTION ORAL DAILY PRN
Status: DISCONTINUED | OUTPATIENT
Start: 2023-08-13 | End: 2023-08-15 | Stop reason: HOSPADM

## 2023-08-13 RX ORDER — PANTOPRAZOLE SODIUM 40 MG/1
40 TABLET, DELAYED RELEASE ORAL
Status: DISCONTINUED | OUTPATIENT
Start: 2023-08-13 | End: 2023-08-15 | Stop reason: HOSPADM

## 2023-08-13 RX ORDER — BISACODYL 5 MG/1
5 TABLET, DELAYED RELEASE ORAL DAILY PRN
Status: DISCONTINUED | OUTPATIENT
Start: 2023-08-13 | End: 2023-08-15 | Stop reason: HOSPADM

## 2023-08-13 RX ORDER — CEFTRIAXONE SODIUM 1 G/50ML
1000 INJECTION, SOLUTION INTRAVENOUS EVERY 24 HOURS
Status: DISCONTINUED | OUTPATIENT
Start: 2023-08-13 | End: 2023-08-14

## 2023-08-13 RX ORDER — TRAMADOL HYDROCHLORIDE 50 MG/1
50 TABLET ORAL EVERY 12 HOURS PRN
Status: DISCONTINUED | OUTPATIENT
Start: 2023-08-13 | End: 2023-08-15 | Stop reason: HOSPADM

## 2023-08-13 RX ORDER — AMOXICILLIN 250 MG
2 CAPSULE ORAL 2 TIMES DAILY
Status: DISCONTINUED | OUTPATIENT
Start: 2023-08-13 | End: 2023-08-15 | Stop reason: HOSPADM

## 2023-08-13 RX ORDER — HYDRALAZINE HYDROCHLORIDE 20 MG/ML
10 INJECTION INTRAMUSCULAR; INTRAVENOUS ONCE
Status: COMPLETED | OUTPATIENT
Start: 2023-08-13 | End: 2023-08-13

## 2023-08-13 RX ORDER — SODIUM CHLORIDE 9 MG/ML
40 INJECTION, SOLUTION INTRAVENOUS AS NEEDED
Status: DISCONTINUED | OUTPATIENT
Start: 2023-08-13 | End: 2023-08-15 | Stop reason: HOSPADM

## 2023-08-13 RX ADMIN — TRAMADOL HYDROCHLORIDE 50 MG: 50 TABLET, COATED ORAL at 23:19

## 2023-08-13 RX ADMIN — ACETAMINOPHEN 650 MG: 325 TABLET ORAL at 14:58

## 2023-08-13 RX ADMIN — CEFTRIAXONE SODIUM 1000 MG: 1 INJECTION, SOLUTION INTRAVENOUS at 20:53

## 2023-08-13 RX ADMIN — HEPARIN SODIUM 5000 UNITS: 5000 INJECTION INTRAVENOUS; SUBCUTANEOUS at 08:21

## 2023-08-13 RX ADMIN — HEPARIN SODIUM 5000 UNITS: 5000 INJECTION INTRAVENOUS; SUBCUTANEOUS at 20:52

## 2023-08-13 RX ADMIN — PANTOPRAZOLE SODIUM 40 MG: 40 TABLET, DELAYED RELEASE ORAL at 05:11

## 2023-08-13 RX ADMIN — TRAMADOL HYDROCHLORIDE 50 MG: 50 TABLET, COATED ORAL at 10:19

## 2023-08-13 RX ADMIN — SENNOSIDES AND DOCUSATE SODIUM 2 TABLET: 50; 8.6 TABLET ORAL at 20:52

## 2023-08-13 RX ADMIN — SENNOSIDES AND DOCUSATE SODIUM 2 TABLET: 50; 8.6 TABLET ORAL at 08:21

## 2023-08-13 RX ADMIN — Medication 10 ML: at 20:53

## 2023-08-13 RX ADMIN — HYDRALAZINE HYDROCHLORIDE 10 MG: 20 INJECTION, SOLUTION INTRAMUSCULAR; INTRAVENOUS at 22:36

## 2023-08-13 RX ADMIN — ACETAMINOPHEN 650 MG: 325 TABLET ORAL at 19:39

## 2023-08-13 RX ADMIN — Medication 10 ML: at 08:22

## 2023-08-13 NOTE — PLAN OF CARE
Goal Outcome Evaluation:  Plan of Care Reviewed With: patient   Patient is alert and orientated to self and place with confusion noted. Vitals are stable. Patient has complained of right and left hip/leg pain, repositioning and PRN medications are effective. Patient is resting well in bed at this time.     Progress: no change

## 2023-08-13 NOTE — PROGRESS NOTES
[facility]   Hospitalist Progress Note  Date: 2023  Patient Name: Colleen Gates  : 1933  MRN: [id number]  Date of admission: 2023    Subjective   Subjective     Chief Complaint:   Fall, hip pain    Summary:   Colleen Gates is a 89 y.o. female with a past medical history of GERD, hypertension, hyperlipidemia, osteoporosis presented to the ED for evaluation of fall. As per the patient's , patient is able to ambulate without assistance at baseline.  She fell yesterday night, after the fall she was able to walk to the bed and since today morning she has not been able to bear weight on her left hip, complaining of pain on her left hip and unable to lift her left lower extremity.  Patient does not remember how she fell.  Also complaining of headaches since the fall.  Thinks she hit her head when she fell.  Unwitnessed fall.  Denies losing consciousness.  Denies any vision changes, focal weakness, numbness, tingling, chest pain, shortness of breath, nausea, vomiting, dysuria.     Interval Followup:   No acute events overnight, patient complaining of leg pain, weakness    Objective   Objective     Vitals:   Temp:  [97.3 øF (36.3 øC)-97.6 øF (36.4 øC)] 97.6 øF (36.4 øC)  Heart Rate:  [68-88] 68  Resp:  [16] 16  BP: (148-209)/() 168/72    Physical Exam   GEN: No acute distress  HEENT: Moist mucous membranes  LUNGS: Equal chest rise bilaterally  CARDIAC: Regular rate and rhythm  NEURO: Moving all 4 extremities spontaneously  SKIN: No obvious breakdown    Result Review    Result Review:  I have personally reviewed the results as below  [x]  Laboratory CBC, CMP personally reviewed  CBC          2022    17:36 2023    21:05   CBC   WBC 5.77  8.70    RBC 4.84  5.23    Hemoglobin 13.9  15.1    Hematocrit 42.7  45.9    MCV 88.2  87.8    MCH 28.7  28.9    MCHC 32.6  32.9    RDW 13.2  13.7    Platelets 150  130      CMP          2022    17:36 2023    21:05   CMP   Glucose 113   105    BUN 20  14    Creatinine 1.14  1.22    EGFR 46.1  42.2    Sodium 142  139    Potassium 4.6  4.0    Chloride 106  104    Calcium 9.4  8.9    Total Protein 7.5  6.8    Albumin 4.60  4.3    Globulin 2.9  2.5    Total Bilirubin 0.3  0.8    Alkaline Phosphatase 86  60    AST (SGOT) 17  17    ALT (SGPT) 7  14    Albumin/Globulin Ratio 1.6  1.7    BUN/Creatinine Ratio 17.5  11.5    Anion Gap 11.7  10.9      []  Microbiology  []  Radiology  []  EKG/Telemetry   []  Cardiology/Vascular   []  Pathology  []  Old records  []  Other:    Assessment & Plan   Assessment / Plan     Assessment:  Fall  Left hip pain  Difficulty ambulating  Concern for UTI  Age-indeterminate compression fractures of T11, L4 and L5  GERD     Plan  Admit under observation, MedSurg  Pain control with p.o. Tylenol  Physical therapy evaluation  Continue Protonix  Follow-up on urine cultures, this is pending  Continue ceftriaxone for 3 days  Fall precautions  OT consult for TLSO brace for compression fractures  CBC, CMP reviewed  Repeat CBC, CMP, mag and Phos in a.m.  X-ray of the spine personally reviewed, T11 L4 and L5 compression deformities noted  We will need outpatient follow-up with neurosurgery to follow spinal fractures     Reviewed patients labs and imaging, and discussed with patient and nurse at bedside.    DVT prophylaxis:  Medical DVT prophylaxis orders are present.    CODE STATUS:   Level Of Support Discussed With: Patient  Code Status (Patient has no pulse and is not breathing): CPR (Attempt to Resuscitate)  Medical Interventions (Patient has pulse or is breathing): Full Support        Electronically signed by Mega Sam MD, 08/13/23, 11:13 AM EDT.

## 2023-08-13 NOTE — H&P
[facility]  Date: 2023   Patient Name: Colleen Gates  : 1933  MRN: [id number]  Primary Care Physician:  Nadege, No Known  Date of admission: 2023    Subjective   Subjective     Chief Complaint: Fall, left hip pain    HPI:    Colleen Gates is a 89 y.o. female with a past medical history of GERD, hypertension, hyperlipidemia, osteoporosis presented to the ED for evaluation of fall. As per the patient's , patient is able to ambulate without assistance at baseline.  She fell yesterday night, after the fall she was able to walk to the bed and since today morning she has not been able to bear weight on her left hip, complaining of pain on her left hip and unable to lift her left lower extremity.  Patient does not remember how she fell.  Also complaining of headaches since the fall.  Thinks she hit her head when she fell.  Unwitnessed fall.  Denies losing consciousness.  Denies any vision changes, focal weakness, numbness, tingling, chest pain, shortness of breath, nausea, vomiting, dysuria.    In the ED, vital signs temperature 97.5, pulse 88, respiratory 16, blood pressure 209/104 on room air saturating around 95%.  Labs showed creatinine 1.22, creatinine 7 months ago was 1.14, rest of the CMP within normal limits, platelets 130, rest of the CBC within normal limits.  Urinalysis showed 13-20 WBC, negative nitrates, negative leukocytes.  Urine cultures in process.  CT pelvis without contrast showed no acute fracture or malalignment.  CT head without contrast did not show any acute intracranial process.  Findings suggestive of of mild chronic small vessel ischemic disease noted.  X-ray hip left showed no acute fracture or malalignment.  X-ray of the lumbar spine showed compression deformities at T11, L4 and L5 which appears new from prior exam but are otherwise age-indeterminate.  Received Tylenol in the ED.  Patient has been admitted for further  evaluation, physical therapy evaluation and management of the left hip pain.      Personal History     Past Medical History:   Diagnosis Date    GERD (gastroesophageal reflux disease)    Hypertension  Hyperlipidemia  Osteoporosis      History reviewed. No pertinent surgical history.      History reviewed. No pertinent family history.      Social History     Socioeconomic History    Marital status:    Tobacco Use    Smoking status: Never    Smokeless tobacco: Never   Substance and Sexual Activity    Alcohol use: Never    Drug use: Never         Home Medications:  omeprazole    Allergies:  No Known Allergies    Review of Systems   All other systems reviewed and negative except as mentioned above in HPI    Objective   Objective     Vitals:   Temp:  [97.5 øF (36.4 øC)] 97.5 øF (36.4 øC)  Heart Rate:  [83-88] 83  Resp:  [16] 16  BP: (148-209)/() 148/71    Physical Exam    Constitutional: Awake, alert, no acute distress   Eyes: Pupils equal, sclerae anicteric, no conjunctival injection   HENT: NCAT, mucous membranes moist   Respiratory: Clear to auscultation bilaterally, nonlabored respirations    Cardiovascular: RRR, no murmurs, rubs, or gallops, palpable pedal pulses bilaterally   Gastrointestinal: Positive bowel sounds, soft, nontender, nondistended   Musculoskeletal: No bilateral ankle edema, no clubbing or cyanosis to extremities.  No tenderness on the left hip region.  Able to lift her left lower extremity passively.  Denies any pain recurrence with movement at this time.   Psychiatric: Appropriate affect, cooperative   Neurologic: Oriented x 3,  speech clear   Skin: No rashes     Result Review    Result Review:  I have personally reviewed the results from the time of this admission to 8/13/2023 00:20 EDT and agree with these findings:  [x]  Laboratory  []  Microbiology  [x]  Radiology  []  EKG/Telemetry   []  Cardiology/Vascular   []  Pathology  []  Old records  []  Other:        Imaging Results (Last  24 Hours)       Procedure Component Value Units Date/Time    CT Pelvis Without Contrast [[id number]] Collected: 08/12/23 2227     Updated: 08/12/23 2230    Narrative:      PROCEDURE: CT PELVIS WO CONTRAST     COMPARISON: [facility], CR, XR SPINE LUMBAR 2 OR 3 VW, 8/12/2023, 20:42.  [facility], CR, XR HIP W OR WO PELVIS 2-3 VIEW LEFT, 8/12/2023, 20:43.     INDICATIONS: L hip tenderness, reduced ROM, unable to bear weight     PROTOCOL:   Standard imaging protocol performed      RADIATION:   DLP: 358.8 mGy*cm    Automated exposure control was utilized to minimize radiation dose.      TECHNIQUE: After obtaining the patient's consent, CT images were created without intravenous   contrast.  Multiplanar imaging was performed.     FINDINGS:   No acute fracture is identified.  There is generalized osteopenia.  The pelvic ring appears intact.    Moderate degenerative changes are present.  There are compression deformities in the visualized   lower lumbar spine, which appear chronic.  The muscle bulk about the hip appears within normal   limits.  The rectum and urinary bladder are unremarkable.  The uterus is surgically absent.       Impression:       No acute fracture or traumatic malalignment.            SUMAYA PEPE MD         Electronically Signed and Approved By: SUAMYA PEPE MD on 8/12/2023 at 22:27                     CT Head Without Contrast [[id number]] Collected: 08/12/23 2127     Updated: 08/12/23 2131    Narrative:      PROCEDURE: CT HEAD WO CONTRAST     COMPARISON:  None  INDICATIONS: Fall yesterday with headache today     PROTOCOL:   Standard imaging protocol performed      RADIATION:   DLP: 1081.2mGy*cm    MA and/or KV was adjusted to minimize radiation dose.          TECHNIQUE: After obtaining the patient's consent, CT images were obtained without non-ionic   intravenous contrast material.      FINDINGS:   There is no acute intracranial hemorrhage or extra-axial collection.  The ventricles appear normal   in caliber, with no evidence of mass effect or midline shift. The basal cisterns are patent. The   gray-white differentiation is preserved.     The calvarium is intact.  There is mild mucosal disease in the right maxillary sinus. The mastoid   air cells are well-aerated.  Scattered foci of periventricular and subcortical white matter   hypodensities are nonspecific, but likely the sequela of mild chronic small vessel ischemic   disease.  There is a prominent perivascular space in the left basal ganglia.  There is mild   generalized parenchymal atrophy with prominence of the extra-axial spaces.       Impression:         1. No acute intracranial process or calvarial fracture identified.  2. Findings suggestive of mild chronic small vessel ischemic disease.            SUMAYA PEPE MD         Electronically Signed and Approved By: SUMAYA PEPE MD on 8/12/2023 at 21:27                     XR Spine Lumbar 2 or 3 View [[id number]] Collected: 08/12/23 2117     Updated: 08/12/23 2120    Narrative:      PROCEDURE: XR SPINE LUMBAR 2 OR 3 VW     COMPARISON: [facility], ANTHONY LA-SPINE - AP & LAT, 12/29/2010, 13:53.     INDICATIONS: LOW BACK PAIN POST FALL     FINDINGS:   There is dextroscoliosis at the thoracolumbar junction.  There is a moderate anterior wedge   compression deformity at T12 that appears unchanged from prior exam.  There is a moderate anterior   wedge compression deformity at T11 that is new from prior exam.  There is a mild superior endplate   compression deformity at L4, and a moderate compression deformity at L5, which appears new from   prior exam.  There is generalized osteopenia.  Mild discogenic changes are present throughout.    There is mild to moderate facet arthropathy at L2-3 through L5-S1.  The soft tissues are   unremarkable.       Impression:       Compression deformities at T11, L4, and L5, which appears new from prior exam from   2010, but are  otherwise age-indeterminate.            SUMAYA PEPE MD         Electronically Signed and Approved By: SUMAYA PEPE MD on 8/12/2023 at 21:17                     XR Hip With or Without Pelvis 2 - 3 View Left [[id number]] Collected: 08/12/23 2114     Updated: 08/12/23 2117    Narrative:      PROCEDURE: XR HIP W OR WO PELVIS 2-3 VIEW LEFT     COMPARISON: None     INDICATIONS: LEFT HIP PAIN POST FALL     FINDINGS:   No acute fracture is identified.  The pelvic ring appears intact.  Moderate degenerative changes   are present.  The soft tissues are unremarkable.       Impression:       No acute fracture or traumatic malalignment identified.               SUMAYA PEPE MD         Electronically Signed and Approved By: SUMAYA PEPE MD on 8/12/2023 at 21:14                                     Assessment & Plan   Assessment / Plan     Assessment/Plan:   Fall  Left hip pain  Difficulty ambulating  Concern for UTI  Age-indeterminate compression fractures of T11, L4 and L5  GERD    Plan  Admit under observation, MedSurg  Pain control with p.o. Tylenol  Physical therapy evaluation  Continue pantoprazole  Follow-up on urine cultures  Continue ceftriaxone for 3 days  Fall precautions      DVT prophylaxis:  No DVT prophylaxis order currently exists.    CODE STATUS:    Level Of Support Discussed With: Patient  Code Status (Patient has no pulse and is not breathing): CPR (Attempt to Resuscitate)  Medical Interventions (Patient has pulse or is breathing): Full Support      Admission Status:  I believe this patient meets observation status.    Part of this note may be an electronic transcription/translation of spoken language to printed text using the Dragon Dictation System    Mary Diez MD

## 2023-08-13 NOTE — SIGNIFICANT NOTE
08/13/23 0945   Plan   Plan LEATHA RN met with patient and spouse at bedside.  Patient reports spouse and son provide assistance.  Patient is able to bathe herself but unable to provide other ADL's.  Patient and spouse are agreeable to rehab if needed.  CM sent referrals for inpatient rehab.  Facesheet verified.  Reports patients PCP has retired but spouse will contact his PCP, Dr. Ramirez, for patient to follow up with and have as a new PCP.  Will continue to monitor for discharge needs.

## 2023-08-13 NOTE — PLAN OF CARE
Goal Outcome Evaluation:      PT RESTING QUIETLY IN BED WITH EYES CLOSED. NO SIGNS OF PAIN OR DISTRESS NOTED AT THIS TIME. CALL LIGHT IN REACH.

## 2023-08-14 LAB
ALBUMIN SERPL-MCNC: 4 G/DL (ref 3.5–5.2)
ALBUMIN/GLOB SERPL: 1.3 G/DL
ALP SERPL-CCNC: 58 U/L (ref 39–117)
ALT SERPL W P-5'-P-CCNC: 9 U/L (ref 1–33)
ANION GAP SERPL CALCULATED.3IONS-SCNC: 12.7 MMOL/L (ref 5–15)
AST SERPL-CCNC: 14 U/L (ref 1–32)
BACTERIA SPEC AEROBE CULT: NO GROWTH
BASOPHILS # BLD AUTO: 0.03 10*3/MM3 (ref 0–0.2)
BASOPHILS NFR BLD AUTO: 0.4 % (ref 0–1.5)
BILIRUB SERPL-MCNC: 0.4 MG/DL (ref 0–1.2)
BUN SERPL-MCNC: 15 MG/DL (ref 8–23)
BUN/CREAT SERPL: 18.3 (ref 7–25)
CALCIUM SPEC-SCNC: 9 MG/DL (ref 8.2–9.6)
CHLORIDE SERPL-SCNC: 104 MMOL/L (ref 98–107)
CO2 SERPL-SCNC: 19.3 MMOL/L (ref 22–29)
CREAT SERPL-MCNC: 0.82 MG/DL (ref 0.57–1)
DEPRECATED RDW RBC AUTO: 47.2 FL (ref 37–54)
EGFRCR SERPLBLD CKD-EPI 2021: 68 ML/MIN/1.73
EOSINOPHIL # BLD AUTO: 0.03 10*3/MM3 (ref 0–0.4)
EOSINOPHIL NFR BLD AUTO: 0.4 % (ref 0.3–6.2)
ERYTHROCYTE [DISTWIDTH] IN BLOOD BY AUTOMATED COUNT: 13.9 % (ref 12.3–15.4)
GLOBULIN UR ELPH-MCNC: 3.1 GM/DL
GLUCOSE SERPL-MCNC: 112 MG/DL (ref 65–99)
HCT VFR BLD AUTO: 52 % (ref 34–46.6)
HGB BLD-MCNC: 16.2 G/DL (ref 12–15.9)
IMM GRANULOCYTES # BLD AUTO: 0.01 10*3/MM3 (ref 0–0.05)
IMM GRANULOCYTES NFR BLD AUTO: 0.1 % (ref 0–0.5)
LYMPHOCYTES # BLD AUTO: 1 10*3/MM3 (ref 0.7–3.1)
LYMPHOCYTES NFR BLD AUTO: 14.6 % (ref 19.6–45.3)
MAGNESIUM SERPL-MCNC: 2.6 MG/DL (ref 1.6–2.4)
MCH RBC QN AUTO: 28.8 PG (ref 26.6–33)
MCHC RBC AUTO-ENTMCNC: 31.2 G/DL (ref 31.5–35.7)
MCV RBC AUTO: 92.5 FL (ref 79–97)
MONOCYTES # BLD AUTO: 0.45 10*3/MM3 (ref 0.1–0.9)
MONOCYTES NFR BLD AUTO: 6.6 % (ref 5–12)
NEUTROPHILS NFR BLD AUTO: 5.32 10*3/MM3 (ref 1.7–7)
NEUTROPHILS NFR BLD AUTO: 77.9 % (ref 42.7–76)
NRBC BLD AUTO-RTO: 0 /100 WBC (ref 0–0.2)
PHOSPHATE SERPL-MCNC: 2.4 MG/DL (ref 2.5–4.5)
PLATELET # BLD AUTO: 136 10*3/MM3 (ref 140–450)
PMV BLD AUTO: 11.9 FL (ref 6–12)
POTASSIUM SERPL-SCNC: 3.9 MMOL/L (ref 3.5–5.2)
PROT SERPL-MCNC: 7.1 G/DL (ref 6–8.5)
RBC # BLD AUTO: 5.62 10*6/MM3 (ref 3.77–5.28)
SODIUM SERPL-SCNC: 136 MMOL/L (ref 136–145)
WBC NRBC COR # BLD: 6.84 10*3/MM3 (ref 3.4–10.8)

## 2023-08-14 PROCEDURE — 85025 COMPLETE CBC W/AUTO DIFF WBC: CPT | Performed by: INTERNAL MEDICINE

## 2023-08-14 PROCEDURE — 80053 COMPREHEN METABOLIC PANEL: CPT | Performed by: INTERNAL MEDICINE

## 2023-08-14 PROCEDURE — 84100 ASSAY OF PHOSPHORUS: CPT | Performed by: INTERNAL MEDICINE

## 2023-08-14 PROCEDURE — 83735 ASSAY OF MAGNESIUM: CPT | Performed by: INTERNAL MEDICINE

## 2023-08-14 PROCEDURE — 97760 ORTHOTIC MGMT&TRAING 1ST ENC: CPT

## 2023-08-14 PROCEDURE — 97165 OT EVAL LOW COMPLEX 30 MIN: CPT

## 2023-08-14 PROCEDURE — L0464 TLSO 4MOD SACRO-SCAP PRE: HCPCS

## 2023-08-14 PROCEDURE — 97161 PT EVAL LOW COMPLEX 20 MIN: CPT

## 2023-08-14 PROCEDURE — 97530 THERAPEUTIC ACTIVITIES: CPT

## 2023-08-14 PROCEDURE — 25010000002 HEPARIN (PORCINE) PER 1000 UNITS: Performed by: STUDENT IN AN ORGANIZED HEALTH CARE EDUCATION/TRAINING PROGRAM

## 2023-08-14 RX ORDER — OXYCODONE HYDROCHLORIDE AND ACETAMINOPHEN 5; 325 MG/1; MG/1
1 TABLET ORAL EVERY 6 HOURS PRN
Status: DISCONTINUED | OUTPATIENT
Start: 2023-08-14 | End: 2023-08-15 | Stop reason: HOSPADM

## 2023-08-14 RX ADMIN — Medication 10 ML: at 20:49

## 2023-08-14 RX ADMIN — SENNOSIDES AND DOCUSATE SODIUM 2 TABLET: 50; 8.6 TABLET ORAL at 09:18

## 2023-08-14 RX ADMIN — Medication 10 ML: at 09:18

## 2023-08-14 RX ADMIN — PANTOPRAZOLE SODIUM 40 MG: 40 TABLET, DELAYED RELEASE ORAL at 06:31

## 2023-08-14 RX ADMIN — SENNOSIDES AND DOCUSATE SODIUM 2 TABLET: 50; 8.6 TABLET ORAL at 20:48

## 2023-08-14 RX ADMIN — HEPARIN SODIUM 5000 UNITS: 5000 INJECTION INTRAVENOUS; SUBCUTANEOUS at 09:18

## 2023-08-14 RX ADMIN — HEPARIN SODIUM 5000 UNITS: 5000 INJECTION INTRAVENOUS; SUBCUTANEOUS at 20:48

## 2023-08-14 NOTE — PLAN OF CARE
Goal Outcome Evaluation:  Plan of Care Reviewed With: patient        Progress: no change  Outcome Evaluation: Patient currently presents with an L4, L5 and T11 compression fracture resulting in functional limitations due to back pain and spinal instability which impacts safe completion of ADLs and functional mobility.  The skills of a therapist will be required to safely and effectively implement the following treatment plan to restore maximal level of function: Brace measurement/fitting, education on donning and doffing of brace, proper wear schedule, and ongoing skin integrity checks.      Anticipated Discharge Disposition (OT): skilled nursing facility

## 2023-08-14 NOTE — THERAPY EVALUATION
"Acute Care - Physical Therapy Initial Evaluation   Marie     Patient Name: Colleen Gates  : 1933  MRN: [id number]  Today's Date: 2023      Visit Dx:     ICD-10-CM ICD-9-CM   1. Weakness  R53.1 780.79   2. Unable to ambulate  R26.2 719.7   3. Fall, initial encounter  W19.XXXA E888.9   4. Compression fracture of lumbar vertebra, unspecified lumbar vertebral level, initial encounter  S32.000A 805.4   5. Acute UTI  N39.0 599.0   6. Spinal instabilities of lumbar region  M53.2X6 724.9   7. Difficulty walking  R26.2 719.7     Patient Active Problem List   Diagnosis    Fall     Past Medical History:   Diagnosis Date    GERD (gastroesophageal reflux disease)      History reviewed. No pertinent surgical history.  PT Assessment (last 12 hours)       PT Evaluation and Treatment       Row Name 23 1500          Physical Therapy Time and Intention    Document Type evaluation  -AV     Mode of Treatment individual therapy;physical therapy  -AV       Row Name 23 1500          General Information    Patient Profile Reviewed yes  -AV     Patient Observations alert;cooperative  -AV     Prior Level of Function --  Assist from spouse with ADLs. Reports ambulating with RW, \"if I need it.\" No home O2.  -AV     Equipment Currently Used at Home walker, rolling  as needed  -AV     Existing Precautions/Restrictions TLSO;fall;brace worn when out of bed  No bending, twisting, lifting greater than 5 lbs.  -AV       Row Name 23 1500          Living Environment    Current Living Arrangements home  -AV     Home Accessibility stairs within home  -AV     People in Home spouse  -AV       Row Name 23 1500          Stairs Within Home, Primary    Stairs, Within Home, Primary Nonessential flight to second level of home  -AV       Row Name 23 1500          Cognition    Orientation Status (Cognition) oriented to;person;place  -AV       Row Name 23 1500          Range of Motion (ROM)    Range of Motion " bilateral lower extremities;ROM is WFL  -AV       Row Name 08/14/23 1500          Strength (Manual Muscle Testing)    Strength (Manual Muscle Testing) bilateral lower extremities  3+/5  -AV       Row Name 08/14/23 1500          Bed Mobility    Comment, (Bed Mobility) Patient scooting and repositioning self in bed  -AV       Row Name 08/14/23 1500          Transfers    Comment, (Transfers) Patient had just returned to bed from recliner. Completed transfers and ambulated with assist of 1  -AV       Row Name 08/14/23 1500          Plan of Care Review    Plan of Care Reviewed With patient  -AV     Progress no change  -AV     Outcome Evaluation Patient presents with deficits in balance, endurance, transfers, and ambulation. Patient will benefit from skilled PT services to address these mobility deficits and decrease risk of falls.  -AV       Row Name 08/14/23 1500          Therapy Assessment/Plan (PT)    Rehab Potential (PT) good, to achieve stated therapy goals  -AV     Criteria for Skilled Interventions Met (PT) yes;meets criteria  -AV     Therapy Frequency (PT) daily  -AV     Predicted Duration of Therapy Intervention (PT) 10 days  -AV     Problem List (PT) problems related to;balance;cognition;mobility;strength  -AV     Activity Limitations Related to Problem List (PT) unable to transfer safely;unable to ambulate safely  -AV       Row Name 08/14/23 1500          PT Evaluation Complexity    History, PT Evaluation Complexity 1-2 personal factors and/or comorbidities  -AV     Examination of Body Systems (PT Eval Complexity) total of 4 or more elements  -AV     Clinical Presentation (PT Evaluation Complexity) stable  -AV     Clinical Decision Making (PT Evaluation Complexity) low complexity  -AV     Overall Complexity (PT Evaluation Complexity) low complexity  -AV       Row Name 08/14/23 1500          Therapy Plan Review/Discharge Plan (PT)    Therapy Plan Review (PT) evaluation/treatment results reviewed;patient  -AV        Row Name 08/14/23 1500          Physical Therapy Goals    Bed Mobility Goal Selection (PT) bed mobility, PT goal 1  -AV     Transfer Goal Selection (PT) transfer, PT goal 1  -AV     Gait Training Goal Selection (PT) gait training, PT goal 1  -AV       Row Name 08/14/23 1500          Bed Mobility Goal 1 (PT)    Activity/Assistive Device (Bed Mobility Goal 1, PT) sit to supine/supine to sit  -AV     Bollinger Level/Cues Needed (Bed Mobility Goal 1, PT) supervision required  -AV     Time Frame (Bed Mobility Goal 1, PT) 10 days  -AV       Row Name 08/14/23 1500          Transfer Goal 1 (PT)    Activity/Assistive Device (Transfer Goal 1, PT) sit-to-stand/stand-to-sit;bed-to-chair/chair-to-bed;walker, rolling  -AV     Bollinger Level/Cues Needed (Transfer Goal 1, PT) supervision required  -AV     Time Frame (Transfer Goal 1, PT) 10 days  -AV       Row Name 08/14/23 1500          Gait Training Goal 1 (PT)    Activity/Assistive Device (Gait Training Goal 1, PT) gait (walking locomotion);assistive device use;walker, rolling  -AV     Bollinger Level (Gait Training Goal 1, PT) supervision required  -AV     Distance (Gait Training Goal 1, PT) 150  -AV     Time Frame (Gait Training Goal 1, PT) 10 days  -AV               User Key  (r) = Recorded By, (t) = Taken By, (c) = Cosigned By      Initials Name Provider Type    Oc Shane, PT Physical Therapist                    Physical Therapy Education       Title: PT OT SLP Therapies (In Progress)       Topic: Physical Therapy (In Progress)       Point: Mobility training (Done)       Learning Progress Summary             Patient Acceptance, E,TB, VU by HAM at 8/14/2023 1538                         Point: Home exercise program (Not Started)       Learner Progress:  Not documented in this visit.              Point: Body mechanics (Done)       Learning Progress Summary             Patient Acceptance, E,TB, VU by HAM at 8/14/2023 7418                         Point:  Precautions (Done)       Learning Progress Summary             Patient Acceptance, E,TB, VU by AV at 8/14/2023 1538                                         User Key       Initials Effective Dates Name Provider Type Discipline    AV 06/11/21 -  Oc Kraft, PT Physical Therapist PT                  PT Recommendation and Plan  Anticipated Discharge Disposition (PT): skilled nursing facility  Planned Therapy Interventions (PT): balance training, bed mobility training, gait training, home exercise program, neuromuscular re-education, strengthening, transfer training  Therapy Frequency (PT): daily  Plan of Care Reviewed With: patient  Progress: no change  Outcome Evaluation: Patient presents with deficits in balance, endurance, transfers, and ambulation. Patient will benefit from skilled PT services to address these mobility deficits and decrease risk of falls.   Outcome Measures       Row Name 08/14/23 1500             How much help from another person do you currently need...    Turning from your back to your side while in flat bed without using bedrails? 3  -AV      Moving from lying on back to sitting on the side of a flat bed without bedrails? 3  -AV      Moving to and from a bed to a chair (including a wheelchair)? 3  -AV      Standing up from a chair using your arms (e.g., wheelchair, bedside chair)? 3  -AV      Climbing 3-5 steps with a railing? 2  -AV      To walk in hospital room? 3  -AV      AM-PAC 6 Clicks Score (PT) 17  -AV         Functional Assessment    Outcome Measure Options AM-PAC 6 Clicks Basic Mobility (PT)  -AV                User Key  (r) = Recorded By, (t) = Taken By, (c) = Cosigned By      Initials Name Provider Type    AV Oc Kraft, PT Physical Therapist                     Time Calculation:    PT Charges       Row Name 08/14/23 1537             Time Calculation    PT Received On 08/14/23  -AV      PT Goal Re-Cert Due Date 08/23/23  -AV         Untimed Charges    PT Eval/Re-eval  Minutes 25  -AV         Total Minutes    Untimed Charges Total Minutes 25  -AV       Total Minutes 25  -AV                User Key  (r) = Recorded By, (t) = Taken By, (c) = Cosigned By      Initials Name Provider Type    AV Oc Kraft, PT Physical Therapist                  Therapy Charges for Today       Code Description Service Date Service Provider Modifiers Qty    [id number] HC PT EVAL LOW COMPLEXITY 2 8/14/2023 Oc Kraft, PT GP 1            PT G-Codes  Outcome Measure Options: AM-PAC 6 Clicks Basic Mobility (PT)  AM-PAC 6 Clicks Score (PT): 17  AM-PAC 6 Clicks Score (OT): 14    Oc Kraft PT  8/14/2023

## 2023-08-14 NOTE — PLAN OF CARE
Goal Outcome Evaluation:  Plan of Care Reviewed With: patient        Progress: no change  Outcome Evaluation: Patient presents with deficits in balance, endurance, transfers, and ambulation. Patient will benefit from skilled PT services to address these mobility deficits and decrease risk of falls.      Anticipated Discharge Disposition (PT): skilled nursing facility

## 2023-08-14 NOTE — THERAPY EVALUATION
Patient Name: Colleen Gates  : 1933    MRN: [id number]                              Today's Date: 2023       Admit Date: 2023    Visit Dx:     ICD-10-CM ICD-9-CM   1. Weakness  R53.1 780.79   2. Unable to ambulate  R26.2 719.7   3. Fall, initial encounter  W19.XXXA E888.9   4. Compression fracture of lumbar vertebra, unspecified lumbar vertebral level, initial encounter  S32.000A 805.4   5. Acute UTI  N39.0 599.0   6. Spinal instabilities of lumbar region  M53.2X6 724.9     Patient Active Problem List   Diagnosis    Fall     Past Medical History:   Diagnosis Date    GERD (gastroesophageal reflux disease)      History reviewed. No pertinent surgical history.   General Information       Row Name 23 1114 23 0825       OT Time and Intention    Document Type therapy note (daily note)  -PG evaluation  -PG    Mode of Treatment individual therapy;occupational therapy  -PG individual therapy;occupational therapy  -PG      Row Name 23 0825          General Information    Patient Profile Reviewed yes  Patient lives with her spouse and pleasantly confused.  Reports that she uses rolling walker occasionally and is able to dress and bathe herself.  Occasionally her  will assist  -PG     Prior Level of Function independent:;transfer;ADL's  -PG     Existing Precautions/Restrictions TLSO;fall;brace worn when out of bed  No bending/twisting or lifting greater than 5 pounds.  TLSO to be worn when upright greater than 30 degrees  -PG     Barriers to Rehab none identified  -PG       Row Name 23 0825          Occupational Profile    Reason for Services/Referral (Occupational Profile) Patient admitted to [facility] on 2023 for a fall.  Occupational Therapy consulted to provide TLSO due to T11 and L4 and L5 compression fracture in order to promote indicated immobilization and assist patient to function at higher level.  ADLs and functional transfers also assess due to recent  decline in ADL function.  No previousOccupational Therapy services reported.  -PG       Row Name 08/14/23 0825          Living Environment    People in Home spouse  -PG       Row Name 08/14/23 0825          Cognition    Orientation Status (Cognition) person;place;verbal cues/prompts needed for orientation  -PG       Row Name 08/14/23 0825          Safety Issues, Functional Mobility    Impairments Affecting Function (Mobility) balance;cognition;endurance/activity tolerance;strength;pain  -PG     Cognitive Impairments, Mobility Safety/Performance problem-solving/reasoning;sequencing abilities;insight into deficits/self-awareness;other (see comments)  Spinal instability  -PG               User Key  (r) = Recorded By, (t) = Taken By, (c) = Cosigned By      Initials Name Provider Type    PG Srikanth Berumen, OT Occupational Therapist                     Mobility/ADL's       Row Name 08/14/23 0830          Bed Mobility    Bed Mobility supine-sit  -PG     Supine-Sit [place] (Bed Mobility) maximum assist (25% patient effort)  -PG       Row Name 08/14/23 1114 08/14/23 0830       Transfers    Transfers bed-chair transfer  -PG bed-chair transfer  -PG      Row Name 08/14/23 1114 08/14/23 0830       Bed-Chair Transfer    Bed-Chair [place] (Transfers) verbal cues;contact guard;minimum assist (75% patient effort)  -PG verbal cues;contact guard;minimum assist (75% patient effort)  -PG    Assistive Device (Bed-Chair Transfers) walker, front-wheeled  -PG walker, front-wheeled  -PG      Row Name 08/14/23 0830          Activities of Daily Living    BADL Assessment/Intervention bathing;upper body dressing;lower body dressing;grooming;toileting  -PG       Row Name 08/14/23 0830          Bathing Assessment/Intervention    [place] Level (Bathing) bathing skills;maximum assist (25% patient effort)  -PG       Row Name 08/14/23 0830          Upper Body Dressing Assessment/Training    [place] Level (Upper Body Dressing) upper  body dressing skills;maximum assist (25% patient effort);dependent (less than 25% patient effort)  -PG       Row Name 08/14/23 0830          Lower Body Dressing Assessment/Training    [place] Level (Lower Body Dressing) lower body dressing skills;dependent (less than 25% patient effort)  -PG       Row Name 08/14/23 0830          Grooming Assessment/Training    [place] Level (Grooming) grooming skills;minimum assist (75% patient effort)  -PG       Row Name 08/14/23 0830          Toileting Assessment/Training    [place] Level (Toileting) toileting skills;dependent (less than 25% patient effort)  -PG               User Key  (r) = Recorded By, (t) = Taken By, (c) = Cosigned By      Initials Name Provider Type    Srikanth Hernandez OT Occupational Therapist                   Obj/Interventions       Row Name 08/14/23 0831          Sensory Assessment (Somatosensory)    Sensory Assessment (Somatosensory) sensation intact  -PG       Row Name 08/14/23 0831          Vision Assessment/Intervention    Visual Impairment/Limitations WFL  -PG       Row Name 08/14/23 0831          Range of Motion Comprehensive    General Range of Motion no range of motion deficits identified  -PG       Row Name 08/14/23 0831          Strength Comprehensive (MMT)    Comment, General Manual Muscle Testing (MMT) Assessment 4 -/5 bilateral upper extremities  -PG       Row Name 08/14/23 0831          Motor Skills    Motor Skills coordination;functional endurance  -PG     Coordination WFL  -PG     Functional Endurance Fair minus  -PG               User Key  (r) = Recorded By, (t) = Taken By, (c) = Cosigned By      Initials Name Provider Type    Srikanth Hernandez OT Occupational Therapist                Spinal Instability Due to compression fracture Goal 1 (OT)  Activity/Device: Patient and/or primary caregiver will voice understanding and/or demonstrate ability to don/doff TLSO brace  [place] Level/Cues Needed: Independent  Time Frame:  long term goal (LTG); 10 days  Treatment: Dispense DonJoy brace TLSO (small),Back Brace II, [employer] -3, , effective 8/14/23, orthotic management and training     Goals/Plan       Row Name 08/14/23 1111          Transfer Goal 1 (OT)    Activity/Assistive Device (Transfer Goal 1, OT) transfers, all  -PG     [place] Level/Cues Needed (Transfer Goal 1, OT) modified independence  -PG     Time Frame (Transfer Goal 1, OT) long term goal (LTG);30 days;10 days  -PG       Row Name 08/14/23 1111          Bathing Goal 1 (OT)    Activity/Device (Bathing Goal 1, OT) bathing skills, all  -PG     [place] Level/Cues Needed (Bathing Goal 1, OT) modified independence  -PG     Time Frame (Bathing Goal 1, OT) long term goal (LTG);10 days  -PG       Row Name 08/14/23 1111          Dressing Goal 1 (OT)    Activity/Device (Dressing Goal 1, OT) dressing skills, all  -PG     [place]/Cues Needed (Dressing Goal 1, OT) modified independence  -PG     Time Frame (Dressing Goal 1, OT) long term goal (LTG);10 days  -PG       Row Name 08/14/23 1111          Toileting Goal 1 (OT)    Activity/Device (Toileting Goal 1, OT) toileting skills, all  -PG     [place] Level/Cues Needed (Toileting Goal 1, OT) modified independence  -PG     Time Frame (Toileting Goal 1, OT) long term goal (LTG);10 days  -PG       Row Name 08/14/23 1111          Grooming Goal 1 (OT)    Activity/Device (Grooming Goal 1, OT) grooming skills, all  -PG     [place] (Grooming Goal 1, OT) modified independence  -PG     Time Frame (Grooming Goal 1, OT) long term goal (LTG);10 days  -PG       Row Name 08/14/23 1111          Strength Goal 1 (OT)    Strength Goal 1 (OT) Patient will improve bilateral upper extremity strength to 4+/5 to support independence and engagement with ADL activities  -PG     Time Frame (Strength Goal 1, OT) long term goal (LTG);10 days  -PG       Row Name 08/14/23 1111          Problem Specific Goal 1 (OT)    Problem  Specific Goal 1 (OT) Patient and primary caregiver will verbalize understanding and be able to demonstrate ability to don and doff TLSO brace with minimal assistance  -PG     Time Frame (Problem Specific Goal 1, OT) long term goal (LTG);10 days  -PG       Row Name 08/14/23 1111          Therapy Assessment/Plan (OT)    Planned Therapy Interventions (OT) activity tolerance training;strengthening exercise;transfer/mobility retraining;patient/caregiver education/training;occupation/activity based interventions;orthotic fabrication/fitting/training;BADL retraining  -PG               User Key  (r) = Recorded By, (t) = Taken By, (c) = Cosigned By      Initials Name Provider Type    PG Srikanth Berumen OT Occupational Therapist                Orthotic Management/Training:  Location: Spine  Location Modifier: L3  Type: TLSO with sternal notch  Type Modifier: Small  Functional Improvement: Increased ADL function, improved positioning, decreased pain, improved joint/muscle support  Fitting/Training Provided: Sizing of orthotic, instructions in use, modification of orthotic    Orthotic Fabrication/Fit:  Location: Spine  Kind of Orthotic Needed: TLSO rigid 4 piece with sternal notch  Therapist's Intervention: Device custom fitted  Custom Fitting Provided: Measured patient's circumference above/around umbilicus 28 inches, small, selected appropriate brace as per manufacture guidelines, adjusted velcro fastening components to accurately meet width requirements for patient, and sternal notch for proper fit/comfort (IF TLSO) and educated patient on use of lateral knobs to adjust tension/support as needed with position changes (IF LSO)  Type of Orthotic Provided: TLSO rigid 4 piece with sternal notch  Brand Dispensed: [email or web address]  Model Name and # Dispensed:Back Brace II (if TLSO) -3  Size Dispensed: Small  Anterior Support From/To: Pubis to sternum  Posterior Support From/To: T1-S5  Orthotic Materials: Velcro, padding,  strapping  Reason for Orthotic: Improve ADL function, optimal positioning, pain management, joint/muscle support  Date to Initiate Orthotic Use: 8/14/2023  Wearing Schedule: When upright greater than 30 degrees  Orthotic Site Condition: Intact  Tolerance: Good  Duration: Per physician orders   Clinical Impression       Row Name 08/14/23 1108          Pain Assessment    Pretreatment Pain Rating 0/10 - no pain  -PG     Posttreatment Pain Rating 0/10 - no pain  -PG       Row Name 08/14/23 1108          Plan of Care Review    Plan of Care Reviewed With patient  -PG     Progress no change  -PG     Outcome Evaluation Patient currently presents with an L4, L5 and T11 compression fracture resulting in functional limitations due to back pain and spinal instability which impacts safe completion of ADLs and functional mobility.  The skills of a therapist will be required to safely and effectively implement the following treatment plan to restore maximal level of function: Brace measurement/fitting, education on donning and doffing of brace, proper wear schedule, and ongoing skin integrity checks.  -PG       Row Name 08/14/23 1108          Therapy Assessment/Plan (OT)    Patient/Family Therapy Goal Statement (OT) Get stronger and return home independently  -PG     Rehab Potential (OT) good, to achieve stated therapy goals  -PG     Criteria for Skilled Therapeutic Interventions Met (OT) yes;meets criteria;skilled treatment is necessary  -PG     Therapy Frequency (OT) 5 times/wk  -PG       Row Name 08/14/23 1108          Therapy Plan Review/Discharge Plan (OT)    Anticipated Discharge Disposition (OT) skilled nursing facility  -PG               User Key  (r) = Recorded By, (t) = Taken By, (c) = Cosigned By      Initials Name Provider Type    PG Srikanth Berumen, OT Occupational Therapist                   Outcome Measures       Row Name 08/14/23 1113          How much help from another is currently needed...    Putting on and taking  off regular lower body clothing? 1  -PG     Bathing (including washing, rinsing, and drying) 2  -PG     Toileting (which includes using toilet bed pan or urinal) 1  -PG     Putting on and taking off regular upper body clothing 3  -PG     Taking care of personal grooming (such as brushing teeth) 3  -PG     Eating meals 4  -PG     AM-PAC 6 Clicks Score (OT) 14  -PG       Row Name 08/14/23 0918          How much help from another person do you currently need...    Turning from your back to your side while in flat bed without using bedrails? 3  -[place]     Moving from lying on back to sitting on the side of a flat bed without bedrails? 3  -[place]     Moving to and from a bed to a chair (including a wheelchair)? 2  -[place]     Standing up from a chair using your arms (e.g., wheelchair, bedside chair)? 2  -[place]     Climbing 3-5 steps with a railing? 1  -KY     To walk in hospital room? 2  -KY     AM-PAC 6 Clicks Score (PT) 13  -KY     Highest level of mobility 4 --> Transferred to chair/commode  -KY       Row Name 08/14/23 1113          Functional Assessment    Outcome Measure Options AM-PAC 6 Clicks Daily Activity (OT);Optimal Instrument  -PG       Row Name 08/14/23 1113          Optimal Instrument    Optimal Instrument Optimal - 3  -PG     Bending/Stooping 4  -PG     Standing 2  -PG     Reaching 2  -PG     From the list, choose the 3 activities you would most like to be able to do without any difficulty Bending/stooping;Standing;Reaching  -PG     Total Score Optimal - 3 8  -PG               User Key  (r) = Recorded By, (t) = Taken By, (c) = Cosigned By      Initials Name Provider Type    PG Srikanth Berumen OT Occupational Therapist    Neetu Cabral, RN Registered Nurse                    Occupational Therapy Education       Title: PT OT SLP Therapies (In Progress)       Topic: Occupational Therapy (In Progress)       Point: ADL training (In Progress)       Description:   Instruct learner(s) on proper safety adaptation and  remediation techniques during self care or transfers.   Instruct in proper use of assistive devices.                  Learning Progress Summary             Patient Acceptance, E, NR by PG at 8/14/2023 1113                         Point: Home exercise program (In Progress)       Description:   Instruct learner(s) on appropriate technique for monitoring, assisting and/or progressing therapeutic exercises/activities.                  Learning Progress Summary             Patient Acceptance, E, NR by PG at 8/14/2023 1113                         Point: Precautions (In Progress)       Description:   Instruct learner(s) on prescribed precautions during self-care and functional transfers.                  Learning Progress Summary             Patient Acceptance, E, NR by PG at 8/14/2023 1113                         Point: Body mechanics (In Progress)       Description:   Instruct learner(s) on proper positioning and spine alignment during self-care, functional mobility activities and/or exercises.                  Learning Progress Summary             Patient Acceptance, E, NR by PG at 8/14/2023 1113                                         User Key       Initials Effective Dates Name Provider Type Discipline    PG 06/16/21 -  Srikanth Berumen OT Occupational Therapist OT                  OT Recommendation and Plan  Planned Therapy Interventions (OT): activity tolerance training, strengthening exercise, transfer/mobility retraining, patient/caregiver education/training, occupation/activity based interventions, orthotic fabrication/fitting/training, BADL retraining  Therapy Frequency (OT): 5 times/wk  Plan of Care Review  Plan of Care Reviewed With: patient  Progress: no change  Outcome Evaluation: Patient currently presents with an L4, L5 and T11 compression fracture resulting in functional limitations due to back pain and spinal instability which impacts safe completion of ADLs and functional mobility.  The skills of a  therapist will be required to safely and effectively implement the following treatment plan to restore maximal level of function: Brace measurement/fitting, education on donning and doffing of brace, proper wear schedule, and ongoing skin integrity checks.     Time Calculation:   Evaluation Complexity (OT)  Review Occupational Profile/Medical/Therapy History Complexity: brief/low complexity  Assessment, Occupational Performance/Identification of Deficit Complexity: 3-5 performance deficits  Clinical Decision Making Complexity (OT): problem focused assessment/low complexity  Overall Complexity of Evaluation (OT): low complexity     Time Calculation- OT       Row Name 08/14/23 1115             Time Calculation- OT    OT Received On 08/14/23  -PG      OT Goal Re-Cert Due Date 08/22/23  -PG         Timed Charges    21795 - OT Therapeutic Activity Minutes 10  -PG      27829 - OT Orthotic Management 15  -PG         Untimed Charges    OT Eval/Re-eval Minutes 30  -PG      L-Codes 851931 TLSO 4 RIGID PC SMITH SCAP+FIT  -PG      717197 TLSO 4 RIGID PC SMITH SCAP+FIT 10  -PG         Total Minutes    Timed Charges Total Minutes 25  -PG      Untimed Charges Total Minutes 40  -PG       Total Minutes 65  -PG                User Key  (r) = Recorded By, (t) = Taken By, (c) = Cosigned By      Initials Name Provider Type    PG Srikanth Berumen OT Occupational Therapist                  Therapy Charges for Today       Code Description Service Date Service Provider Modifiers Qty    24158619910 HC OT THERAPEUTIC ACT EA 15 MIN 8/14/2023 Srikanth Berumen OT GO 1    48388576017 HC OT EVAL LOW COMPLEXITY 2 8/14/2023 Srikanth Berumen OT GO 1    47655484202 HC OT ORTHOTIC MGMT/TRAIN EA 15 MIN 8/14/2023 Srikanth Berumen OT GO 1    27080491022 HC BRACE BACK TLSO RIGID  8/14/2023 Srikanth Berumen OT  1                 Srikanth Berumen OT  8/14/2023

## 2023-08-14 NOTE — CONSULTS
"Nutrition Services    Patient Name: Colleen Gates  YOB: 1933  MRN: [id number]  Admission date: 8/12/2023      CLINICAL NUTRITION ASSESSMENT      Reason for Assessment  Identified at risk by screening criteria, BMI     H&P:    Past Medical History:   Diagnosis Date    GERD (gastroesophageal reflux disease)         Current Problems:   Active Hospital Problems    Diagnosis     **Fall         Nutrition/Diet History         Narrative     Patient admitted s/p fall. No pertinent PMH.     Followed by RD for BMI of 14.9. No significant weight history available, but patient has lost weight over the last 6 months and the last 4 years. Patient meets criteria for severe malnutrition with severe muscle wasting and fat loss. Encouraged patient to try ONS to help meet estimated nutrient needs and promote weight/muscle gain. Patient states she may not be here tonight, but is receptive to vanilla Ensure with meals.  at bedside states he feels it would be beneficial.    Will order and CTM per protocol.     Anthropometrics        Current Height, Weight Height: 157.5 cm (62\")  Weight: 37.1 kg (81 lb 11.2 oz)   Current BMI Body mass index is 14.94 kg/mý.       Weight Hx  Wt Readings from Last 30 Encounters:   08/12/23 1958 37.1 kg (81 lb 11.2 oz)   12/21/22 1616 39.7 kg (87 lb 8.4 oz)   05/21/19 0000 47.4 kg (104 lb 8 oz)   04/23/19 0000 48.2 kg (106 lb 4 oz)   04/01/19 0000 48.3 kg (106 lb 7 oz)            Wt Change Observation -6.5% x 8 mos      Estimated/Assessed Needs       Energy Requirements 35-40 kcal/kg   EST Needs (kcal/day) 8579-4462       Protein Requirements 1.2-1.5 g/kg   EST Daily Needs (g/day) 45-55       Fluid Requirements 35 ml/kg    Estimated Needs (mL/day) 1300      Labs/Medications         Pertinent Labs Reviewed.   Results from last 7 days   Lab Units 08/14/23  0451 08/12/23  2105   SODIUM mmol/L 136 139   POTASSIUM mmol/L 3.9 4.0   CHLORIDE mmol/L 104 104   CO2 mmol/L 19.3* 24.1   BUN mg/dL 15 " 14   CREATININE mg/dL 0.82 1.22*   CALCIUM mg/dL 9.0 8.9   BILIRUBIN mg/dL 0.4 0.8   ALK PHOS U/L 58 60   ALT (SGPT) U/L 9 14   AST (SGOT) U/L 14 17   GLUCOSE mg/dL 112* 105*     Results from last 7 days   Lab Units 08/14/23  0451   MAGNESIUM mg/dL 2.6*   PHOSPHORUS mg/dL 2.4*   HEMOGLOBIN g/dL 16.2*   HEMATOCRIT % 52.0*     No results found for: COVID19  No results found for: HGBA1C      Pertinent Medications Reviewed.     Current Nutrition Orders & Evaluation of Intake       Oral Nutrition     Current PO Diet Diet: Regular/House Diet; Texture: Mechanical Ground (NDD 2); Fluid Consistency: Thin (IDDSI 0)   Supplement No active supplement orders       Malnutrition Severity Assessment      Patient meets criteria for : Severe Malnutrition  Malnutrition Type (last 8 hours)       Malnutrition Severity Assessment       Row Name 08/14/23 1342       Malnutrition Severity Assessment    Malnutrition Type Starvation - Related Malnutrition      Row Name 08/14/23 1342       Insufficient Energy Intake     Insufficient Energy Intake Findings None      Row Name 08/14/23 1342       Unintentional Weight Loss     Unintentional Weight Loss Findings None      Row Name 08/14/23 1342       Muscle Loss    Loss of Muscle Mass Findings Severe    [place] Region Severe - deep hollowing/scooping, lack of muscle to touch, facial bones well defined    Clavicle Bone Region Severe - protruding prominent bone    Acromion Bone Region Severe - squared shoulders, bones, and acromion process protrusion prominent    Dorsal Hand Region Severe - prominent depression    Patellar Region Severe - prominent bone, square looking, very little muscle definition    Anterior Thigh Region Severe - line/depression along thigh, obviously thin    Posterior Calf Region Severe - thin with very little definition/firmness      Row Name 08/14/23 1342       Fat Loss    Subcutaneous Fat Loss Findings Severe    Orbital Region  Severe - pronounced hollowness/depression, dark  circles, loose saggy skin    Upper Arm Region Severe - mostly skin, very little space between folds, fingers touch    Thoracic & Lumbar Region Severe - ribs visible with prominent depressions, iliac crest very prominent      Row Name 08/14/23 1342       Criteria Met (Must meet criteria for severity in at least 2 of these categories: M Wasting, Fat Loss, Fluid, Secondary Signs, Wt. Status, Intake)    Patient meets criteria for  Severe Malnutrition                       Nutrition Diagnosis         Nutrition Dx Problem 1 Severe malnutrition related to inadequate energy Intake as evidenced by  severe muscle wasting and fat loss.       Nutrition Intervention         Vanilla Ensure Enlive TID (+1050 kcal, 60 g protein)     Medical Nutrition Therapy/Nutrition Education          Learner     Readiness Patient and Significant Other  Acceptance     Method     Response Explanation  Verbalizes understanding and Needs reinforcement     Monitor/Evaluation        Monitor Per protocol, PO intake, Supplement intake, Pertinent labs, Weight       Nutrition Discharge Plan         To be determined       Electronically signed by:  Finesse Reyes RD  08/14/23 13:44 EDT

## 2023-08-14 NOTE — PLAN OF CARE
Goal Outcome Evaluation:  Progress: no change  Outcome Evaluation: Pt voided approximately six times during the shift with a variation in output from 50mls to 150mls. Pt's BP elevated to 218/98 around 2214 and 10mgs of Hydralazine was given. Pt's BP at this time is 160/82. Pt received PRN Tylenol and Ultram each once during the shift.

## 2023-08-14 NOTE — CASE MANAGEMENT/SOCIAL WORK
"Sent to Dr. Hand for UR on 8/13/23  -     \"89-year-old female hospital in 8/12/2023, remains hospitalized as of 8/13/2023 for treatment and management of UTI, compression fractions of T-spine and L-spine, on antibiotics, working on ambulation for safe disposition planning, has not improved during the observation period, change status to inpatient.\"  "

## 2023-08-14 NOTE — PROGRESS NOTES
[facility]   Hospitalist Progress Note  Date: 2023  Patient Name: Colleen Gates  : 1933  MRN: [id number]  Date of admission: 2023    Subjective   Subjective     Chief Complaint:   Fall, hip pain    Summary:   Colleen Gates is a 89 y.o. female with a past medical history of GERD, hypertension, hyperlipidemia, osteoporosis presented to the ED for evaluation of fall. As per the patient's , patient is able to ambulate without assistance at baseline.  She fell yesterday night, after the fall she was able to walk to the bed and since today morning she has not been able to bear weight on her left hip, complaining of pain on her left hip and unable to lift her left lower extremity.  Patient does not remember how she fell.  Also complaining of headaches since the fall.  Thinks she hit her head when she fell.  Unwitnessed fall.  Denies losing consciousness.  Denies any vision changes, focal weakness, numbness, tingling, chest pain, shortness of breath, nausea, vomiting, dysuria.     Imaging was consistent with compression fractures, patient had TLSO brace fitted by OT, patient will need follow-up with neurosurgery as an outpatient for surveillance    Interval Followup:   No acute events overnight, pain controlled, patient needs to ambulate today    Objective   Objective     Vitals:   Temp:  [95.6 øF (35.3 øC)-98 øF (36.7 øC)] 97.4 øF (36.3 øC)  Heart Rate:  [77-95] 89  Resp:  [16-18] 18  BP: (158-218)/(80-98) 183/89    Physical Exam   GEN: No acute distress  HEENT: Moist mucous membranes  LUNGS: Equal chest rise bilaterally  CARDIAC: Regular rate and rhythm  NEURO: Moving all 4 extremities spontaneously  SKIN: No obvious breakdown    Result Review    Result Review:  I have personally reviewed the results as below  [x]  Laboratory CBC, CMP personally reviewed  CBC          2022    17:36 2023    21:05 2023    04:51   CBC   WBC 5.77  8.70  6.84    RBC 4.84  5.23  5.62    Hemoglobin  13.9  15.1  16.2    Hematocrit 42.7  45.9  52.0    MCV 88.2  87.8  92.5    MCH 28.7  28.9  28.8    MCHC 32.6  32.9  31.2    RDW 13.2  13.7  13.9    Platelets 150  130  136      CMP          12/21/2022    17:36 8/12/2023    21:05 8/14/2023    04:51   CMP   Glucose 113  105  112    BUN 20  14  15    Creatinine 1.14  1.22  0.82    EGFR 46.1  42.2  68.0    Sodium 142  139  136    Potassium 4.6  4.0  3.9    Chloride 106  104  104    Calcium 9.4  8.9  9.0    Total Protein 7.5  6.8  7.1    Albumin 4.60  4.3  4.0    Globulin 2.9  2.5  3.1    Total Bilirubin 0.3  0.8  0.4    Alkaline Phosphatase 86  60  58    AST (SGOT) 17  17  14    ALT (SGPT) 7  14  9    Albumin/Globulin Ratio 1.6  1.7  1.3    BUN/Creatinine Ratio 17.5  11.5  18.3    Anion Gap 11.7  10.9  12.7    []  Microbiology  []  Radiology  []  EKG/Telemetry   []  Cardiology/Vascular   []  Pathology  []  Old records  []  Other:    Assessment & Plan   Assessment / Plan     Assessment:  Fall  Left hip pain  Difficulty ambulating  Concern for UTI  Age-indeterminate compression fractures of T11, L4 and L5  GERD     Plan  Admit under observation, MedSurg  Pain control with p.o. Tylenol, Ultram, add Percocet for severe breakthrough pain  Physical therapy evaluation  Continue Protonix  Urine culture reviewed, no growth to date  Continue ceftriaxone for 3 days  Fall precautions  PT/OT consult  CBC, CMP reviewed  Repeat CBC, CMP, mag and Phos in a.m.  X-ray of the spine personally reviewed, T11 L4 and L5 compression deformities noted  Ambulate 3 times daily with brace  We will need outpatient follow-up with neurosurgery to follow spinal fractures  Neurosurgery follow-up as outpatient     Reviewed patients labs and imaging, and discussed with patient and nurse at bedside.    DVT prophylaxis:  Medical DVT prophylaxis orders are present.    CODE STATUS:   Level Of Support Discussed With: Patient  Code Status (Patient has no pulse and is not breathing): CPR (Attempt to  Resuscitate)  Medical Interventions (Patient has pulse or is breathing): Full Support        Electronically signed by Mega Sam MD, 08/14/23, 11:31 AM EDT.

## 2023-08-14 NOTE — PLAN OF CARE
Goal Outcome Evaluation:  Plan of Care Reviewed With: patient        Progress: no change  Outcome Evaluation: Pt vss. Pt had no c/o pain. Pt is a/o x4 but forgetful. Possible dc tomorrow. Continue plan of care

## 2023-08-15 ENCOUNTER — READMISSION MANAGEMENT (OUTPATIENT)
Dept: CALL CENTER | Facility: HOSPITAL | Age: 88
End: 2023-08-15
Payer: MEDICARE

## 2023-08-15 VITALS
HEART RATE: 92 BPM | SYSTOLIC BLOOD PRESSURE: 138 MMHG | WEIGHT: 81.7 LBS | DIASTOLIC BLOOD PRESSURE: 65 MMHG | BODY MASS INDEX: 15.04 KG/M2 | HEIGHT: 62 IN | RESPIRATION RATE: 18 BRPM | OXYGEN SATURATION: 100 % | TEMPERATURE: 98.4 F

## 2023-08-15 LAB
ALBUMIN SERPL-MCNC: 3.5 G/DL (ref 3.5–5.2)
ALBUMIN/GLOB SERPL: 1.3 G/DL
ALP SERPL-CCNC: 48 U/L (ref 39–117)
ALT SERPL W P-5'-P-CCNC: 7 U/L (ref 1–33)
ANION GAP SERPL CALCULATED.3IONS-SCNC: 10.1 MMOL/L (ref 5–15)
AST SERPL-CCNC: 12 U/L (ref 1–32)
BASOPHILS # BLD AUTO: 0.02 10*3/MM3 (ref 0–0.2)
BASOPHILS NFR BLD AUTO: 0.3 % (ref 0–1.5)
BILIRUB SERPL-MCNC: 0.4 MG/DL (ref 0–1.2)
BUN SERPL-MCNC: 18 MG/DL (ref 8–23)
BUN/CREAT SERPL: 20.9 (ref 7–25)
CALCIUM SPEC-SCNC: 8.6 MG/DL (ref 8.2–9.6)
CHLORIDE SERPL-SCNC: 105 MMOL/L (ref 98–107)
CO2 SERPL-SCNC: 21.9 MMOL/L (ref 22–29)
CREAT SERPL-MCNC: 0.86 MG/DL (ref 0.57–1)
DEPRECATED RDW RBC AUTO: 46.3 FL (ref 37–54)
EGFRCR SERPLBLD CKD-EPI 2021: 64.3 ML/MIN/1.73
EOSINOPHIL # BLD AUTO: 0.15 10*3/MM3 (ref 0–0.4)
EOSINOPHIL NFR BLD AUTO: 2.5 % (ref 0.3–6.2)
ERYTHROCYTE [DISTWIDTH] IN BLOOD BY AUTOMATED COUNT: 13.9 % (ref 12.3–15.4)
GLOBULIN UR ELPH-MCNC: 2.6 GM/DL
GLUCOSE SERPL-MCNC: 97 MG/DL (ref 65–99)
HCT VFR BLD AUTO: 47.7 % (ref 34–46.6)
HGB BLD-MCNC: 15.1 G/DL (ref 12–15.9)
IMM GRANULOCYTES # BLD AUTO: 0.01 10*3/MM3 (ref 0–0.05)
IMM GRANULOCYTES NFR BLD AUTO: 0.2 % (ref 0–0.5)
LYMPHOCYTES # BLD AUTO: 1.11 10*3/MM3 (ref 0.7–3.1)
LYMPHOCYTES NFR BLD AUTO: 18.3 % (ref 19.6–45.3)
MAGNESIUM SERPL-MCNC: 2.2 MG/DL (ref 1.6–2.4)
MCH RBC QN AUTO: 28.4 PG (ref 26.6–33)
MCHC RBC AUTO-ENTMCNC: 31.7 G/DL (ref 31.5–35.7)
MCV RBC AUTO: 89.8 FL (ref 79–97)
MONOCYTES # BLD AUTO: 0.46 10*3/MM3 (ref 0.1–0.9)
MONOCYTES NFR BLD AUTO: 7.6 % (ref 5–12)
NEUTROPHILS NFR BLD AUTO: 4.33 10*3/MM3 (ref 1.7–7)
NEUTROPHILS NFR BLD AUTO: 71.1 % (ref 42.7–76)
NRBC BLD AUTO-RTO: 0 /100 WBC (ref 0–0.2)
PHOSPHATE SERPL-MCNC: 2.9 MG/DL (ref 2.5–4.5)
PLATELET # BLD AUTO: 122 10*3/MM3 (ref 140–450)
PMV BLD AUTO: 11.8 FL (ref 6–12)
POTASSIUM SERPL-SCNC: 4.2 MMOL/L (ref 3.5–5.2)
PROT SERPL-MCNC: 6.1 G/DL (ref 6–8.5)
RBC # BLD AUTO: 5.31 10*6/MM3 (ref 3.77–5.28)
SODIUM SERPL-SCNC: 137 MMOL/L (ref 136–145)
WBC NRBC COR # BLD: 6.08 10*3/MM3 (ref 3.4–10.8)

## 2023-08-15 PROCEDURE — 80053 COMPREHEN METABOLIC PANEL: CPT | Performed by: INTERNAL MEDICINE

## 2023-08-15 PROCEDURE — 85025 COMPLETE CBC W/AUTO DIFF WBC: CPT | Performed by: INTERNAL MEDICINE

## 2023-08-15 PROCEDURE — 84100 ASSAY OF PHOSPHORUS: CPT | Performed by: INTERNAL MEDICINE

## 2023-08-15 PROCEDURE — 83735 ASSAY OF MAGNESIUM: CPT | Performed by: INTERNAL MEDICINE

## 2023-08-15 PROCEDURE — 25010000002 HEPARIN (PORCINE) PER 1000 UNITS: Performed by: STUDENT IN AN ORGANIZED HEALTH CARE EDUCATION/TRAINING PROGRAM

## 2023-08-15 RX ORDER — ACETAMINOPHEN 325 MG/1
650 TABLET ORAL EVERY 4 HOURS PRN
Qty: 30 TABLET | Refills: 0 | Status: SHIPPED | OUTPATIENT
Start: 2023-08-15

## 2023-08-15 RX ADMIN — SENNOSIDES AND DOCUSATE SODIUM 2 TABLET: 50; 8.6 TABLET ORAL at 09:38

## 2023-08-15 RX ADMIN — HEPARIN SODIUM 5000 UNITS: 5000 INJECTION INTRAVENOUS; SUBCUTANEOUS at 09:38

## 2023-08-15 RX ADMIN — Medication 10 ML: at 09:38

## 2023-08-15 RX ADMIN — PANTOPRAZOLE SODIUM 40 MG: 40 TABLET, DELAYED RELEASE ORAL at 05:57

## 2023-08-15 NOTE — CASE MANAGEMENT/SOCIAL WORK
OBS- 8/12/23  INPT- 8/13/23  DC-  8/15/23    LOS <3 MN s- INPT approved by Physican Advisor review on 8/13/23  No exception

## 2023-08-15 NOTE — OUTREACH NOTE
Prep Survey      Flowsheet Row Responses   [facility] patient discharged from? [facility]   Is LACE score < 7 ? Yes   Eligibility Not Eligible   Does the patient have one of the following disease processes/diagnoses(primary or secondary)? Other   Prep survey completed? Yes            Jayashree OSBORN - Registered Nurse

## 2023-08-15 NOTE — PLAN OF CARE
Goal Outcome Evaluation:  Plan of Care Reviewed With: patient        Progress: no change  Outcome Evaluation: Pt dc home today with home health care

## 2023-08-15 NOTE — PLAN OF CARE
Goal Outcome Evaluation:  Plan of Care Reviewed With: patient has been resting thoughout the night, plan of care on going.

## 2023-08-15 NOTE — DISCHARGE SUMMARY
[facility]         HOSPITALIST  DISCHARGE SUMMARY    Patient Name: Colleen Gates  : 1933  MRN: [id number]    Date of Admission: 2023  Date of Discharge:  8/15/2023  Primary Care Physician: Daniel Ramirez Jr., MD    Consults       Date and Time Order Name Status Description    2023 10:47 PM Hospitalist (on-call MD unless specified)              Active and Resolved Hospital Problems:  Fall  Left hip pain  Difficulty ambulating  Concern for UTI  Age-indeterminate compression fractures of T11, L4 and L5  GERD    Hospital Course     Hospital Course:  Colleen Gates is a 89 y.o. female with a past medical history of GERD, hypertension, hyperlipidemia, osteoporosis presented to the ED for evaluation of fall. As per the patient's , patient is able to ambulate without assistance at baseline.  She fell yesterday night, after the fall she was able to walk to the bed and since today morning she has not been able to bear weight on her left hip, complaining of pain on her left hip and unable to lift her left lower extremity.  Patient does not remember how she fell.  Also complaining of headaches since the fall.  Thinks she hit her head when she fell.  Unwitnessed fall.  Denies losing consciousness.  Denies any vision changes, focal weakness, numbness, tingling, chest pain, shortness of breath, nausea, vomiting, dysuria.      Imaging was consistent with compression fractures, patient had TLSO brace fitted by OT, patient will need follow-up with neurosurgery as an outpatient for surveillance.    Patient's pain is controlled, she is ambulating well with brace, patient is discharged home today in stable condition to follow-up with neurosurgery as an outpatient.    Day of Discharge     Vital Signs:  Temp:  [97.2 øF (36.2 øC)-98.4 øF (36.9 øC)] 98.4 øF (36.9 øC)  Heart Rate:  [79-93] 92  Resp:  [18] 18  BP: (132-188)/(65-89) 138/65    Physical Exam:   GEN: No acute distress  HEENT: Moist  mucous membranes  LUNGS: Equal chest rise bilaterally  CARDIAC: Regular rate and rhythm  NEURO: Moving all 4 extremities spontaneously  SKIN: No obvious breakdown    Discharge Details        Discharge Medications        New Medications        Instructions Start Date   acetaminophen 325 MG tablet  Commonly known as: TYLENOL   650 mg, Oral, Every 4 Hours PRN             Continue These Medications        Instructions Start Date   omeprazole 20 MG capsule  Commonly known as: priLOSEC   20 mg, Oral, Daily               No Known Allergies    Discharge Disposition:  Home or Self Care    Diet:  Hospital:  Diet Order   Procedures    Diet: Regular/House Diet; Texture: Mechanical Ground (NDD 2); Fluid Consistency: Thin (IDDSI 0)       Discharge Activity:       CODE STATUS:  Code Status and Medical Interventions:   Ordered at: 08/13/23 0020     Level Of Support Discussed With:    Patient     Code Status (Patient has no pulse and is not breathing):    CPR (Attempt to Resuscitate)     Medical Interventions (Patient has pulse or is breathing):    Full Support       No future appointments.    Additional Instructions for the Follow-ups that You Need to Schedule       Discharge Follow-up with PCP   As directed       Currently Documented PCP:    Daniel Ramirez Jr., MD    PCP Phone Number:    [phone]     Follow Up Details: 3 to 7 days        Discharge Follow-up with Specified Provider: neurosurgery; 2 Weeks   As directed      To: neurosurgery   Follow Up: 2 Weeks                Pertinent  and/or Most Recent Results     IMAGING:  XR Spine Lumbar 2 or 3 View    Result Date: 8/12/2023  PROCEDURE: XR SPINE LUMBAR 2 OR 3 VW  COMPARISON: [facility], ANTHONY LA-SPINE - AP & LAT, 12/29/2010, 13:53.  INDICATIONS: LOW BACK PAIN POST FALL  FINDINGS:  There is dextroscoliosis at the thoracolumbar junction.  There is a moderate anterior wedge compression deformity at T12 that appears unchanged from prior exam.  There is a  moderate anterior wedge compression deformity at T11 that is new from prior exam.  There is a mild superior endplate compression deformity at L4, and a moderate compression deformity at L5, which appears new from prior exam.  There is generalized osteopenia.  Mild discogenic changes are present throughout.  There is mild to moderate facet arthropathy at L2-3 through L5-S1.  The soft tissues are unremarkable.       Compression deformities at T11, L4, and L5, which appears new from prior exam from 2010, but are otherwise age-indeterminate.     SUMAYA PEPE MD       Electronically Signed and Approved By: SUMAYA PEPE MD on 8/12/2023 at 21:17             CT Head Without Contrast    Result Date: 8/12/2023  PROCEDURE: CT HEAD WO CONTRAST  COMPARISON:  None INDICATIONS: Fall yesterday with headache today  PROTOCOL:   Standard imaging protocol performed    RADIATION:   DLP: 1081.2mGy*cm   MA and/or KV was adjusted to minimize radiation dose.     TECHNIQUE: After obtaining the patient's consent, CT images were obtained without non-ionic intravenous contrast material.  FINDINGS:  There is no acute intracranial hemorrhage or extra-axial collection. The ventricles appear normal in caliber, with no evidence of mass effect or midline shift. The basal cisterns are patent. The gray-white differentiation is preserved.  The calvarium is intact.  There is mild mucosal disease in the right maxillary sinus. The mastoid air cells are well-aerated.  Scattered foci of periventricular and subcortical white matter hypodensities are nonspecific, but likely the sequela of mild chronic small vessel ischemic disease.  There is a prominent perivascular space in the left basal ganglia.  There is mild generalized parenchymal atrophy with prominence of the extra-axial spaces.        1. No acute intracranial process or calvarial fracture identified. 2. Findings suggestive of mild chronic small vessel ischemic disease.     SUMAYA PEPE MD        Electronically Signed and Approved By: SUMAYA PEPE MD on 8/12/2023 at 21:27             CT Pelvis Without Contrast    Result Date: 8/12/2023  PROCEDURE: CT PELVIS WO CONTRAST  COMPARISON: [facility], CR, XR SPINE LUMBAR 2 OR 3 VW, 8/12/2023, 20:42.  [facility], CR, XR HIP W OR WO PELVIS 2-3 VIEW LEFT, 8/12/2023, 20:43.  INDICATIONS: L hip tenderness, reduced ROM, unable to bear weight  PROTOCOL:   Standard imaging protocol performed    RADIATION:   DLP: 358.8 mGy*cm   Automated exposure control was utilized to minimize radiation dose.  TECHNIQUE: After obtaining the patient's consent, CT images were created without intravenous contrast.  Multiplanar imaging was performed.  FINDINGS:  No acute fracture is identified.  There is generalized osteopenia.  The pelvic ring appears intact.  Moderate degenerative changes are present.  There are compression deformities in the visualized lower lumbar spine, which appear chronic.  The muscle bulk about the hip appears within normal limits.  The rectum and urinary bladder are unremarkable.  The uterus is surgically absent.       No acute fracture or traumatic malalignment.     SUMAYA PEPE MD       Electronically Signed and Approved By: SUMAYA PEPE MD on 8/12/2023 at 22:27             XR Hip With or Without Pelvis 2 - 3 View Left    Result Date: 8/12/2023  PROCEDURE: XR HIP W OR WO PELVIS 2-3 VIEW LEFT  COMPARISON: None  INDICATIONS: LEFT HIP PAIN POST FALL  FINDINGS:  No acute fracture is identified.  The pelvic ring appears intact.  Moderate degenerative changes are present.  The soft tissues are unremarkable.       No acute fracture or traumatic malalignment identified.      SUMAYA PEPE MD       Electronically Signed and Approved By: SUMAYA PEPE MD on 8/12/2023 at 21:14               LAB RESULTS:      Lab 08/15/23  0534 08/14/23  0451 08/12/23  2105   WBC 6.08 6.84 8.70   HEMOGLOBIN 15.1 16.2* 15.1   HEMATOCRIT 47.7*  52.0* 45.9   PLATELETS 122* 136* 130*   NEUTROS ABS 4.33 5.32 7.14*   IMMATURE GRANS (ABS) 0.01 0.01 0.02   LYMPHS ABS 1.11 1.00 0.85   MONOS ABS 0.46 0.45 0.66   EOS ABS 0.15 0.03 0.01   MCV 89.8 92.5 87.8         Lab 08/15/23  0534 08/14/23  0451 08/12/23  2105   SODIUM 137 136 139   POTASSIUM 4.2 3.9 4.0   CHLORIDE 105 104 104   CO2 21.9* 19.3* 24.1   ANION GAP 10.1 12.7 10.9   BUN 18 15 14   CREATININE 0.86 0.82 1.22*   EGFR 64.3 68.0 42.2*   GLUCOSE 97 112* 105*   CALCIUM 8.6 9.0 8.9   MAGNESIUM 2.2 2.6*  --    PHOSPHORUS 2.9 2.4*  --          Lab 08/15/23  0534 08/14/23  0451 08/12/23  2105   TOTAL PROTEIN 6.1 7.1 6.8   ALBUMIN 3.5 4.0 4.3   GLOBULIN 2.6 3.1 2.5   ALT (SGPT) 7 9 14   AST (SGOT) 12 14 17   BILIRUBIN 0.4 0.4 0.8   ALK PHOS 48 58 60                     Brief Urine Lab Results  (Last result in the past 365 days)        Color   Clarity   Blood   Leuk Est   Nitrite   Protein   CREAT   Urine HCG        08/12/23 2033 Yellow   Clear   Negative   Small (1+)   Negative   Negative                 Microbiology Results (last 10 days)       Procedure Component Value - Date/Time    Urine Culture - Urine, Urine, Clean Catch [[id number]]  (Normal) Collected: 08/12/23 2033    Lab Status: Final result Specimen: Urine, Clean Catch Updated: 08/14/23 0010     Urine Culture No growth          Results for orders placed during the hospital encounter of 12/21/22    Duplex Venous Lower Extremity - Left CAR    Interpretation Summary    Normal left lower extremity venous duplex scan.    Results for orders placed during the hospital encounter of 12/21/22    Duplex Venous Lower Extremity - Left CAR    Interpretation Summary    Normal left lower extremity venous duplex scan.        Time spent on Discharge including face to face service: Greater than 30 minutes      Electronically signed by Mega Sam MD, 08/15/23, 10:50 AM EDT.

## 2025-01-25 ENCOUNTER — HOSPITAL ENCOUNTER (INPATIENT)
Facility: HOSPITAL | Age: OVER 89
LOS: 6 days | Discharge: NURSING, REHAB OR LONG-TERM CARE | End: 2025-01-31
Attending: EMERGENCY MEDICINE | Admitting: STUDENT IN AN ORGANIZED HEALTH CARE EDUCATION/TRAINING PROGRAM
Payer: MEDICARE

## 2025-01-25 ENCOUNTER — APPOINTMENT (OUTPATIENT)
Dept: GENERAL RADIOLOGY | Facility: HOSPITAL | Age: OVER 89
End: 2025-01-25
Payer: MEDICARE

## 2025-01-25 DIAGNOSIS — R13.12 OROPHARYNGEAL DYSPHAGIA: ICD-10-CM

## 2025-01-25 DIAGNOSIS — N17.9 ACUTE KIDNEY INJURY: ICD-10-CM

## 2025-01-25 DIAGNOSIS — U07.1 COVID-19: Primary | ICD-10-CM

## 2025-01-25 DIAGNOSIS — R53.81 PHYSICAL DECONDITIONING: ICD-10-CM

## 2025-01-25 DIAGNOSIS — R26.2 DIFFICULTY WALKING: ICD-10-CM

## 2025-01-25 DIAGNOSIS — F03.B0 MODERATE DEMENTIA WITHOUT BEHAVIORAL DISTURBANCE, PSYCHOTIC DISTURBANCE, MOOD DISTURBANCE, OR ANXIETY, UNSPECIFIED DEMENTIA TYPE: ICD-10-CM

## 2025-01-25 DIAGNOSIS — E86.0 DEHYDRATION: ICD-10-CM

## 2025-01-25 DIAGNOSIS — Z78.9 DECREASED ACTIVITIES OF DAILY LIVING (ADL): ICD-10-CM

## 2025-01-25 PROBLEM — R62.7 FAILURE TO THRIVE IN ADULT: Status: ACTIVE | Noted: 2025-01-25

## 2025-01-25 LAB
ALBUMIN SERPL-MCNC: 3.2 G/DL (ref 3.5–5.2)
ALBUMIN/GLOB SERPL: 1 G/DL
ALP SERPL-CCNC: 58 U/L (ref 39–117)
ALT SERPL W P-5'-P-CCNC: 7 U/L (ref 1–33)
ANION GAP SERPL CALCULATED.3IONS-SCNC: 10.9 MMOL/L (ref 5–15)
AST SERPL-CCNC: 14 U/L (ref 1–32)
BASOPHILS # BLD AUTO: 0.02 10*3/MM3 (ref 0–0.2)
BASOPHILS NFR BLD AUTO: 0.2 % (ref 0–1.5)
BILIRUB SERPL-MCNC: 0.3 MG/DL (ref 0–1.2)
BUN SERPL-MCNC: 34 MG/DL (ref 8–23)
BUN/CREAT SERPL: 28.8 (ref 7–25)
CALCIUM SPEC-SCNC: 8.7 MG/DL (ref 8.2–9.6)
CHLORIDE SERPL-SCNC: 109 MMOL/L (ref 98–107)
CO2 SERPL-SCNC: 23.1 MMOL/L (ref 22–29)
CREAT SERPL-MCNC: 1.18 MG/DL (ref 0.57–1)
DEPRECATED RDW RBC AUTO: 51.5 FL (ref 37–54)
EGFRCR SERPLBLD CKD-EPI 2021: 43.7 ML/MIN/1.73
EOSINOPHIL # BLD AUTO: 0 10*3/MM3 (ref 0–0.4)
EOSINOPHIL NFR BLD AUTO: 0 % (ref 0.3–6.2)
ERYTHROCYTE [DISTWIDTH] IN BLOOD BY AUTOMATED COUNT: 15.8 % (ref 12.3–15.4)
FLUAV SUBTYP SPEC NAA+PROBE: NOT DETECTED
FLUBV RNA ISLT QL NAA+PROBE: NOT DETECTED
GLOBULIN UR ELPH-MCNC: 3.1 GM/DL
GLUCOSE SERPL-MCNC: 83 MG/DL (ref 65–99)
HCT VFR BLD AUTO: 42.8 % (ref 34–46.6)
HGB BLD-MCNC: 13.8 G/DL (ref 12–15.9)
HOLD SPECIMEN: NORMAL
HOLD SPECIMEN: NORMAL
IMM GRANULOCYTES # BLD AUTO: 0.04 10*3/MM3 (ref 0–0.05)
IMM GRANULOCYTES NFR BLD AUTO: 0.4 % (ref 0–0.5)
LYMPHOCYTES # BLD AUTO: 0.77 10*3/MM3 (ref 0.7–3.1)
LYMPHOCYTES NFR BLD AUTO: 8.1 % (ref 19.6–45.3)
MAGNESIUM SERPL-MCNC: 2 MG/DL (ref 1.7–2.3)
MCH RBC QN AUTO: 28.8 PG (ref 26.6–33)
MCHC RBC AUTO-ENTMCNC: 32.2 G/DL (ref 31.5–35.7)
MCV RBC AUTO: 89.4 FL (ref 79–97)
MONOCYTES # BLD AUTO: 0.7 10*3/MM3 (ref 0.1–0.9)
MONOCYTES NFR BLD AUTO: 7.4 % (ref 5–12)
NEUTROPHILS NFR BLD AUTO: 7.93 10*3/MM3 (ref 1.7–7)
NEUTROPHILS NFR BLD AUTO: 83.9 % (ref 42.7–76)
NRBC BLD AUTO-RTO: 0 /100 WBC (ref 0–0.2)
PLATELET # BLD AUTO: 231 10*3/MM3 (ref 140–450)
PMV BLD AUTO: 11.3 FL (ref 6–12)
POTASSIUM SERPL-SCNC: 4.7 MMOL/L (ref 3.5–5.2)
PROT SERPL-MCNC: 6.3 G/DL (ref 6–8.5)
RBC # BLD AUTO: 4.79 10*6/MM3 (ref 3.77–5.28)
RSV RNA NPH QL NAA+NON-PROBE: NOT DETECTED
SARS-COV-2 RNA RESP QL NAA+PROBE: DETECTED
SODIUM SERPL-SCNC: 143 MMOL/L (ref 136–145)
TROPONIN T SERPL HS-MCNC: 34 NG/L
WBC NRBC COR # BLD AUTO: 9.46 10*3/MM3 (ref 3.4–10.8)
WHOLE BLOOD HOLD COAG: NORMAL
WHOLE BLOOD HOLD SPECIMEN: NORMAL

## 2025-01-25 PROCEDURE — 93005 ELECTROCARDIOGRAM TRACING: CPT

## 2025-01-25 PROCEDURE — 71045 X-RAY EXAM CHEST 1 VIEW: CPT

## 2025-01-25 PROCEDURE — 84145 PROCALCITONIN (PCT): CPT | Performed by: STUDENT IN AN ORGANIZED HEALTH CARE EDUCATION/TRAINING PROGRAM

## 2025-01-25 PROCEDURE — 80053 COMPREHEN METABOLIC PANEL: CPT

## 2025-01-25 PROCEDURE — 85025 COMPLETE CBC W/AUTO DIFF WBC: CPT

## 2025-01-25 PROCEDURE — 99285 EMERGENCY DEPT VISIT HI MDM: CPT

## 2025-01-25 PROCEDURE — 84484 ASSAY OF TROPONIN QUANT: CPT

## 2025-01-25 PROCEDURE — 93010 ELECTROCARDIOGRAM REPORT: CPT | Performed by: INTERNAL MEDICINE

## 2025-01-25 PROCEDURE — 36415 COLL VENOUS BLD VENIPUNCTURE: CPT

## 2025-01-25 PROCEDURE — 83735 ASSAY OF MAGNESIUM: CPT

## 2025-01-25 PROCEDURE — 25810000003 SODIUM CHLORIDE 0.9 % SOLUTION: Performed by: EMERGENCY MEDICINE

## 2025-01-25 PROCEDURE — 93005 ELECTROCARDIOGRAM TRACING: CPT | Performed by: EMERGENCY MEDICINE

## 2025-01-25 PROCEDURE — 87637 SARSCOV2&INF A&B&RSV AMP PRB: CPT | Performed by: EMERGENCY MEDICINE

## 2025-01-25 RX ORDER — SODIUM CHLORIDE 9 MG/ML
40 INJECTION, SOLUTION INTRAVENOUS AS NEEDED
Status: DISCONTINUED | OUTPATIENT
Start: 2025-01-25 | End: 2025-01-31 | Stop reason: HOSPADM

## 2025-01-25 RX ORDER — BISACODYL 5 MG/1
5 TABLET, DELAYED RELEASE ORAL DAILY PRN
Status: DISCONTINUED | OUTPATIENT
Start: 2025-01-25 | End: 2025-01-31 | Stop reason: HOSPADM

## 2025-01-25 RX ORDER — BISACODYL 10 MG
10 SUPPOSITORY, RECTAL RECTAL DAILY PRN
Status: DISCONTINUED | OUTPATIENT
Start: 2025-01-25 | End: 2025-01-31 | Stop reason: HOSPADM

## 2025-01-25 RX ORDER — SODIUM CHLORIDE 0.9 % (FLUSH) 0.9 %
10 SYRINGE (ML) INJECTION EVERY 12 HOURS SCHEDULED
Status: DISCONTINUED | OUTPATIENT
Start: 2025-01-26 | End: 2025-01-31 | Stop reason: HOSPADM

## 2025-01-25 RX ORDER — SODIUM CHLORIDE 0.9 % (FLUSH) 0.9 %
10 SYRINGE (ML) INJECTION AS NEEDED
Status: DISCONTINUED | OUTPATIENT
Start: 2025-01-25 | End: 2025-01-31 | Stop reason: HOSPADM

## 2025-01-25 RX ORDER — POLYETHYLENE GLYCOL 3350 17 G/17G
17 POWDER, FOR SOLUTION ORAL DAILY PRN
Status: DISCONTINUED | OUTPATIENT
Start: 2025-01-25 | End: 2025-01-31 | Stop reason: HOSPADM

## 2025-01-25 RX ORDER — AMOXICILLIN 250 MG
2 CAPSULE ORAL 2 TIMES DAILY PRN
Status: DISCONTINUED | OUTPATIENT
Start: 2025-01-25 | End: 2025-01-31 | Stop reason: HOSPADM

## 2025-01-25 RX ADMIN — SODIUM CHLORIDE 500 ML: 9 INJECTION, SOLUTION INTRAVENOUS at 23:16

## 2025-01-26 LAB
BACTERIA UR QL AUTO: ABNORMAL /HPF
BILIRUB UR QL STRIP: NEGATIVE
CLARITY UR: CLEAR
COLOR UR: ABNORMAL
GEN 5 1HR TROPONIN T REFLEX: 33 NG/L
GLUCOSE UR STRIP-MCNC: ABNORMAL MG/DL
HGB UR QL STRIP.AUTO: NEGATIVE
HYALINE CASTS UR QL AUTO: ABNORMAL /LPF
KETONES UR QL STRIP: NEGATIVE
LEUKOCYTE ESTERASE UR QL STRIP.AUTO: ABNORMAL
NITRITE UR QL STRIP: NEGATIVE
PH UR STRIP.AUTO: 5.5 [PH] (ref 5–8)
PROCALCITONIN SERPL-MCNC: 0.07 NG/ML (ref 0–0.25)
PROT UR QL STRIP: ABNORMAL
QT INTERVAL: 365 MS
QTC INTERVAL: 445 MS
RBC # UR STRIP: ABNORMAL /HPF
REF LAB TEST METHOD: ABNORMAL
SP GR UR STRIP: 1.02 (ref 1–1.03)
SQUAMOUS #/AREA URNS HPF: ABNORMAL /HPF
TROPONIN T % DELTA: -3
TROPONIN T NUMERIC DELTA: -1 NG/L
UROBILINOGEN UR QL STRIP: ABNORMAL
WBC # UR STRIP: ABNORMAL /HPF

## 2025-01-26 PROCEDURE — 25810000003 LACTATED RINGERS PER 1000 ML: Performed by: STUDENT IN AN ORGANIZED HEALTH CARE EDUCATION/TRAINING PROGRAM

## 2025-01-26 PROCEDURE — 92610 EVALUATE SWALLOWING FUNCTION: CPT

## 2025-01-26 PROCEDURE — 25010000002 CEFTRIAXONE PER 250 MG: Performed by: STUDENT IN AN ORGANIZED HEALTH CARE EDUCATION/TRAINING PROGRAM

## 2025-01-26 PROCEDURE — 25010000002 HYDRALAZINE PER 20 MG: Performed by: STUDENT IN AN ORGANIZED HEALTH CARE EDUCATION/TRAINING PROGRAM

## 2025-01-26 PROCEDURE — 81001 URINALYSIS AUTO W/SCOPE: CPT | Performed by: STUDENT IN AN ORGANIZED HEALTH CARE EDUCATION/TRAINING PROGRAM

## 2025-01-26 PROCEDURE — 84484 ASSAY OF TROPONIN QUANT: CPT | Performed by: EMERGENCY MEDICINE

## 2025-01-26 PROCEDURE — 25010000002 HALOPERIDOL LACTATE PER 5 MG: Performed by: INTERNAL MEDICINE

## 2025-01-26 PROCEDURE — 94799 UNLISTED PULMONARY SVC/PX: CPT

## 2025-01-26 PROCEDURE — 25010000002 LORAZEPAM PER 2 MG: Performed by: INTERNAL MEDICINE

## 2025-01-26 PROCEDURE — 99223 1ST HOSP IP/OBS HIGH 75: CPT | Performed by: STUDENT IN AN ORGANIZED HEALTH CARE EDUCATION/TRAINING PROGRAM

## 2025-01-26 RX ORDER — SODIUM CHLORIDE, SODIUM LACTATE, POTASSIUM CHLORIDE, CALCIUM CHLORIDE 600; 310; 30; 20 MG/100ML; MG/100ML; MG/100ML; MG/100ML
50 INJECTION, SOLUTION INTRAVENOUS CONTINUOUS
Status: ACTIVE | OUTPATIENT
Start: 2025-01-26 | End: 2025-01-29

## 2025-01-26 RX ORDER — HALOPERIDOL 5 MG/ML
2 INJECTION INTRAMUSCULAR ONCE
Status: COMPLETED | OUTPATIENT
Start: 2025-01-26 | End: 2025-01-26

## 2025-01-26 RX ORDER — PANTOPRAZOLE SODIUM 40 MG/1
40 TABLET, DELAYED RELEASE ORAL
Status: DISCONTINUED | OUTPATIENT
Start: 2025-01-26 | End: 2025-01-31 | Stop reason: HOSPADM

## 2025-01-26 RX ORDER — MEGESTROL ACETATE 40 MG/ML
800 SUSPENSION ORAL DAILY
COMMUNITY
Start: 2025-01-10 | End: 2026-01-11

## 2025-01-26 RX ORDER — AZITHROMYCIN 250 MG/1
500 TABLET, FILM COATED ORAL
Status: DISPENSED | OUTPATIENT
Start: 2025-01-26 | End: 2025-01-29

## 2025-01-26 RX ORDER — ONDANSETRON 2 MG/ML
4 INJECTION INTRAMUSCULAR; INTRAVENOUS EVERY 6 HOURS PRN
Status: DISCONTINUED | OUTPATIENT
Start: 2025-01-26 | End: 2025-01-31 | Stop reason: HOSPADM

## 2025-01-26 RX ORDER — ACETAMINOPHEN 325 MG/1
650 TABLET ORAL EVERY 6 HOURS PRN
Status: DISCONTINUED | OUTPATIENT
Start: 2025-01-26 | End: 2025-01-31 | Stop reason: HOSPADM

## 2025-01-26 RX ORDER — IPRATROPIUM BROMIDE AND ALBUTEROL SULFATE 2.5; .5 MG/3ML; MG/3ML
3 SOLUTION RESPIRATORY (INHALATION) EVERY 6 HOURS PRN
Status: DISCONTINUED | OUTPATIENT
Start: 2025-01-26 | End: 2025-01-31 | Stop reason: HOSPADM

## 2025-01-26 RX ORDER — GUAIFENESIN 600 MG/1
600 TABLET, EXTENDED RELEASE ORAL EVERY 12 HOURS SCHEDULED
Status: DISCONTINUED | OUTPATIENT
Start: 2025-01-26 | End: 2025-01-27

## 2025-01-26 RX ORDER — HYDRALAZINE HYDROCHLORIDE 20 MG/ML
10 INJECTION INTRAMUSCULAR; INTRAVENOUS EVERY 6 HOURS PRN
Status: DISCONTINUED | OUTPATIENT
Start: 2025-01-26 | End: 2025-01-31 | Stop reason: HOSPADM

## 2025-01-26 RX ORDER — MONTELUKAST SODIUM 10 MG/1
10 TABLET ORAL NIGHTLY
COMMUNITY
Start: 2025-01-10 | End: 2026-01-11

## 2025-01-26 RX ORDER — LORAZEPAM 2 MG/ML
0.5 INJECTION INTRAMUSCULAR ONCE
Status: COMPLETED | OUTPATIENT
Start: 2025-01-26 | End: 2025-01-26

## 2025-01-26 RX ADMIN — Medication 10 ML: at 08:10

## 2025-01-26 RX ADMIN — SODIUM CHLORIDE, POTASSIUM CHLORIDE, SODIUM LACTATE AND CALCIUM CHLORIDE 50 ML/HR: 600; 310; 30; 20 INJECTION, SOLUTION INTRAVENOUS at 21:48

## 2025-01-26 RX ADMIN — HYDRALAZINE HYDROCHLORIDE 10 MG: 20 INJECTION INTRAMUSCULAR; INTRAVENOUS at 16:10

## 2025-01-26 RX ADMIN — HALOPERIDOL LACTATE 2 MG: 5 INJECTION, SOLUTION INTRAMUSCULAR at 17:15

## 2025-01-26 RX ADMIN — SODIUM CHLORIDE, POTASSIUM CHLORIDE, SODIUM LACTATE AND CALCIUM CHLORIDE 50 ML/HR: 600; 310; 30; 20 INJECTION, SOLUTION INTRAVENOUS at 02:11

## 2025-01-26 RX ADMIN — SODIUM CHLORIDE 1000 MG: 9 INJECTION INTRAMUSCULAR; INTRAVENOUS; SUBCUTANEOUS at 03:08

## 2025-01-26 RX ADMIN — Medication 10 ML: at 02:12

## 2025-01-26 RX ADMIN — Medication 10 ML: at 21:59

## 2025-01-26 RX ADMIN — LORAZEPAM 0.5 MG: 2 INJECTION INTRAMUSCULAR; INTRAVENOUS at 18:31

## 2025-01-26 RX ADMIN — SODIUM CHLORIDE 1000 MG: 9 INJECTION INTRAMUSCULAR; INTRAVENOUS; SUBCUTANEOUS at 21:59

## 2025-01-26 NOTE — PLAN OF CARE
Goal Outcome Evaluation:      ASSESSMENT/ PLAN OF CARE:  Pt presents with limitations, noted below, that impede patient's ability to tolerate least restrictive diet safely and independently. The skills of a therapist will be required to safely and effectively implement the following treatment plan to restore maximal level of function.    PROBLEMS:  1.  Risk of aspiration, swallow delay, concern for pharyngeal weakness   TREATMENT: Speech therapy for dysphagia, education of strategies, tolerance of trials of p.o. intake.      FREQUENCY/DURATION: Daily, 5 days a week    REHAB POTENTIAL:  Pt has good/fair rehab potential.  The following limitations may influence improvement/ length of tx medical status.    RECOMMENDATIONS:   1.   DIET: Full liquid, nectar thick consistency    2.  POSITION: Fully upright for all p.o. intake, 30 minutes following    3.  COMPENSATORY STRATEGIES: Assist for feeding, single controlled sips at a slow rate.  Cue for double swallow, meds crushed in small amount of applesauce.    4.  Modified barium swallow study for further investigation of swallow function, pharyngeal strengthening and evaluate for any silent aspiration.             Anticipated Discharge Disposition (SLP): anticipate therapy at next level of care

## 2025-01-26 NOTE — PLAN OF CARE
SLP evaluated patient; diet advanced. Patient turned and repositioned Q2 hours. Required x1 dose of PRN BP meds. No acute events during shift.   Problem: Adult Inpatient Plan of Care  Goal: Plan of Care Review  Outcome: Not Progressing  Flowsheets (Taken 1/26/2025 1633)  Progress: declining  Plan of Care Reviewed With:   spouse   patient  Goal: Patient-Specific Goal (Individualized)  Outcome: Not Progressing  Goal: Absence of Hospital-Acquired Illness or Injury  Outcome: Not Progressing  Intervention: Identify and Manage Fall Risk  Recent Flowsheet Documentation  Taken 1/26/2025 1045 by Neetu Wray RN  Safety Promotion/Fall Prevention:   activity supervised   assistive device/personal items within reach   clutter free environment maintained   nonskid shoes/slippers when out of bed   fall prevention program maintained   room organization consistent   safety round/check completed  Taken 1/26/2025 0800 by Neetu Wray RN  Safety Promotion/Fall Prevention:   activity supervised   assistive device/personal items within reach   clutter free environment maintained   fall prevention program maintained   nonskid shoes/slippers when out of bed   safety round/check completed   room organization consistent  Intervention: Prevent Skin Injury  Recent Flowsheet Documentation  Taken 1/26/2025 0750 by Neetu Wray RN  Skin Protection: transparent dressing maintained  Intervention: Prevent Infection  Recent Flowsheet Documentation  Taken 1/26/2025 1045 by Neetu Wray RN  Infection Prevention:   environmental surveillance performed   hand hygiene promoted   rest/sleep promoted  Taken 1/26/2025 0800 by Neetu Wray RN  Infection Prevention:   environmental surveillance performed   hand hygiene promoted   rest/sleep promoted  Goal: Optimal Comfort and Wellbeing  Outcome: Not Progressing  Intervention: Provide Person-Centered Care  Recent Flowsheet Documentation  Taken 1/26/2025 0750 by Nils  Neetu SALMON RN  Trust Relationship/Rapport:   care explained   choices provided   emotional support provided   empathic listening provided   questions answered   questions encouraged   reassurance provided   thoughts/feelings acknowledged  Goal: Readiness for Transition of Care  Outcome: Not Progressing     Problem: Fall Injury Risk  Goal: Absence of Fall and Fall-Related Injury  Outcome: Not Progressing  Intervention: Identify and Manage Contributors  Recent Flowsheet Documentation  Taken 1/26/2025 0750 by Neetu Wray RN  Self-Care Promotion: independence encouraged  Intervention: Promote Injury-Free Environment  Recent Flowsheet Documentation  Taken 1/26/2025 1045 by Neetu Wray RN  Safety Promotion/Fall Prevention:   activity supervised   assistive device/personal items within reach   clutter free environment maintained   nonskid shoes/slippers when out of bed   fall prevention program maintained   room organization consistent   safety round/check completed  Taken 1/26/2025 0800 by Neetu Wray RN  Safety Promotion/Fall Prevention:   activity supervised   assistive device/personal items within reach   clutter free environment maintained   fall prevention program maintained   nonskid shoes/slippers when out of bed   safety round/check completed   room organization consistent     Problem: Skin Injury Risk Increased  Goal: Skin Health and Integrity  Outcome: Not Progressing  Intervention: Optimize Skin Protection  Recent Flowsheet Documentation  Taken 1/26/2025 1045 by Neetu Wray RN  Activity Management: bedrest  Head of Bed (HOB) Positioning: HOB elevated  Taken 1/26/2025 0750 by Neetu Wray RN  Activity Management: bedrest  Pressure Reduction Techniques:   frequent weight shift encouraged   heels elevated off bed   weight shift assistance provided   pressure points protected  Head of Bed (HOB) Positioning: HOB elevated  Pressure Reduction Devices:    pressure-redistributing mattress utilized   positioning supports utilized  Skin Protection: transparent dressing maintained   Goal Outcome Evaluation:  Plan of Care Reviewed With: spouse, patient        Progress: declining

## 2025-01-26 NOTE — ED PROVIDER NOTES
Time: 11:12 PM EST  Date of encounter:  1/25/2025  Independent Historian/Clinical History and Information was obtained by:   Patient and Family  Chief Complaint: Dysphagia and weakness    History is limited by: Dementia    History of Present Illness:  Patient is a 89 y.o. year old female who presents to the emergency department for evaluation of dysphagia and weakness.  The patient's family who was at bedside states that the patient lives alone with her .  They do come over to see the patient daily.  They prepare her meals as well.  They note that the patient does have mild to moderate dementia.  The daughter-in-law who is at bedside states the symptoms actually began December 29.  She is having dysphagia and will not keep anything down.  She was seen at [facility] and sent home.  The patient's symptoms continued so they went back to [facility].  She was admitted to the hospital for some period of time but then discharged home.  The daughter-in-law states the dysphagia has not improved at all.  She will drink only Sprite.  And occasionally she will spit that back up.  She is not taking her medication and she is not eating.  They note that she does have an occasional cough.  They do feel that her confusion may be slightly worse.  They state overall she is getting weaker and they feel that she is deconditioned.  She has had no vomiting.  She has had decreased urinary output.  She typically walks with a walker.  The patient can do that but they feel like she is getting weaker and at risk for fall.  They deny any focal deficits.  The patient has not been complained of any chest pain or shortness of breath or abdominal pain.    HPI    Patient Care Team  Primary Care Provider: Daniel Ramirez Jr., MD    Past Medical History:     No Known Allergies  Past Medical History:   Diagnosis Date    GERD (gastroesophageal reflux disease)      History reviewed. No pertinent surgical history.  History  "reviewed. No pertinent family history.    Home Medications:  Prior to Admission medications    Medication Sig Start Date End Date Taking? Authorizing Provider   acetaminophen (TYLENOL) 325 MG tablet Take 2 tablets by mouth Every 4 (Four) Hours As Needed for Mild Pain, Moderate Pain or Headache. 8/15/23   Mega Sam MD   omeprazole (priLOSEC) 20 MG capsule Take 1 capsule by mouth Daily.    Provider, MD Shania        Social History:   Social History     Tobacco Use    Smoking status: Never     Passive exposure: Never    Smokeless tobacco: Never   Vaping Use    Vaping status: Never Used   Substance Use Topics    Alcohol use: Never    Drug use: Never         Review of Systems:  Review of Systems   Unable to perform ROS: Dementia        Physical Exam:  /82 (BP Location: Right arm, Patient Position: Sitting)   Pulse 90   Temp 97.8 °F (36.6 °C) (Oral)   Resp 18   Ht 157.5 cm (62\")   Wt 32.5 kg (71 lb 10.4 oz)   SpO2 93%   BMI 13.10 kg/m²     Physical Exam  Vitals and nursing note reviewed.   Constitutional:       General: She is not in acute distress.     Appearance: Normal appearance. She is not ill-appearing, toxic-appearing or diaphoretic.   HENT:      Head: Normocephalic and atraumatic.      Mouth/Throat:      Mouth: Mucous membranes are dry.   Eyes:      Pupils: Pupils are equal, round, and reactive to light.   Cardiovascular:      Rate and Rhythm: Normal rate and regular rhythm.      Pulses: Normal pulses.           Carotid pulses are 2+ on the right side and 2+ on the left side.       Radial pulses are 2+ on the right side and 2+ on the left side.        Femoral pulses are 2+ on the right side and 2+ on the left side.       Popliteal pulses are 2+ on the right side and 2+ on the left side.        Dorsalis pedis pulses are 2+ on the right side and 2+ on the left side.        Posterior tibial pulses are 2+ on the right side and 2+ on the left side.      Heart sounds: Normal heart sounds. " No murmur heard.  Pulmonary:      Effort: Pulmonary effort is normal. No accessory muscle usage, respiratory distress or retractions.      Breath sounds: Examination of the right-upper field reveals decreased breath sounds. Examination of the left-upper field reveals decreased breath sounds. Examination of the right-middle field reveals decreased breath sounds. Examination of the left-middle field reveals decreased breath sounds. Examination of the right-lower field reveals decreased breath sounds. Examination of the left-lower field reveals decreased breath sounds. Decreased breath sounds present. No wheezing, rhonchi or rales.   Abdominal:      General: Abdomen is flat. There is no distension.      Palpations: Abdomen is soft. There is no mass or pulsatile mass.      Tenderness: There is no abdominal tenderness. There is no right CVA tenderness, left CVA tenderness, guarding or rebound.      Comments: No rigidity   Musculoskeletal:         General: No swelling, tenderness or deformity.      Cervical back: Neck supple. No tenderness.      Right lower leg: No edema.      Left lower leg: No edema.   Skin:     General: Skin is warm and dry.      Capillary Refill: Capillary refill takes 2 to 3 seconds.      Coloration: Skin is not jaundiced or pale.      Findings: No erythema.   Neurological:      General: No focal deficit present.      Mental Status: She is alert. She is disoriented.      Cranial Nerves: Cranial nerves 2-12 are intact. No cranial nerve deficit.      Sensory: Sensation is intact. No sensory deficit.      Motor: Motor function is intact. No weakness or pronator drift.      Comments: A complete neurological exam could not be performed secondary to comprehension and dementia   Psychiatric:         Attention and Perception: Attention normal.         Mood and Affect: Mood normal.         Behavior: Behavior normal.         Cognition and Memory: Cognition is impaired. Memory is impaired.                   Procedures:  Procedures      Medical Decision Making:      Comorbidities that affect care:    GERD, dementia    External Notes reviewed:    None      The following orders were placed and all results were independently analyzed by me:  Orders Placed This Encounter   Procedures    COVID-19, FLU A/B, RSV PCR 1 HR TAT - Swab, Nasopharynx    XR Chest 1 View    FL Video Swallow With Speech Single Contrast    Drew Draw    Comprehensive Metabolic Panel    High Sensitivity Troponin T    Magnesium    CBC Auto Differential    High Sensitivity Troponin T 1Hr    Urinalysis With Culture If Indicated -    Procalcitonin    Urinalysis, Microscopic Only - Urine, Clean Catch    Comprehensive Metabolic Panel    Magnesium    CBC Auto Differential    Comprehensive Metabolic Panel    Magnesium    Phosphorus    Diet: Liquid; Full Liquid; Fluid Consistency: Nectar Thick    Undress & Gown    Continuous Pulse Oximetry    Vital Signs    Orthostatic Blood Pressure    Straight cath    Vital Signs    Intake & Output    Weigh Patient    Oral Care    Saline Lock & Maintain IV Access    Place Sequential Compression Device    Maintain Sequential Compression Device    Discontinue Cardiac Monitoring    Code Status and Medical Interventions: No CPR (Do Not Attempt to Resuscitate); Limited Support; No intubation (DNI)    Inpatient Hospitalist Consult    Inpatient Nutrition Consult    Dietary Nutrition Supplements Magic Cup    OT Consult: Eval & Treat Discharge Placement Assessment    PT Consult: Eval & Treat Discharge Placement Assessment    PT Consult: Eval & Treat Discharge Placement Assessment    Oxygen Therapy- Nasal Cannula; Titrate 1-6 LPM Per SpO2; 90 - 95%    SLP Consult: Eval & Treat Swallow Disorder    POC Glucose Once    ECG 12 Lead ED Triage Standing Order; Weak / Dizzy / AMS    Insert Peripheral IV    Insert Peripheral IV    Inpatient Admission    Fall Precautions    CBC & Differential    Green Top (Gel)    Lavender Top    Gold Top  - SST    Light Blue Top    CBC & Differential    CBC & Differential       Medications Given in the Emergency Department:  Medications   sodium chloride 0.9 % flush 10 mL (has no administration in time range)   sodium chloride 0.9 % flush 10 mL (10 mL Intravenous Not Given 1/27/25 0946)   sodium chloride 0.9 % flush 10 mL (has no administration in time range)   sodium chloride 0.9 % infusion 40 mL (has no administration in time range)   sennosides-docusate (PERICOLACE) 8.6-50 MG per tablet 2 tablet (has no administration in time range)     And   polyethylene glycol (MIRALAX) packet 17 g (17 g Oral Given 1/27/25 0946)     And   bisacodyl (DULCOLAX) EC tablet 5 mg (has no administration in time range)     And   bisacodyl (DULCOLAX) suppository 10 mg (has no administration in time range)   ipratropium-albuterol (DUO-NEB) nebulizer solution 3 mL (has no administration in time range)   cefTRIAXone (ROCEPHIN) in NS 1 gram/10ml IV PUSH syringe (1,000 mg Intravenous Given 1/26/25 2159)   azithromycin (ZITHROMAX) tablet 500 mg (500 mg Oral Given 1/27/25 0946)   ondansetron (ZOFRAN) injection 4 mg (has no administration in time range)   hydrALAZINE (APRESOLINE) injection 10 mg (10 mg Intravenous Given 1/26/25 1610)   acetaminophen (TYLENOL) tablet 650 mg (has no administration in time range)   lactated ringers infusion (50 mL/hr Intravenous Restarted 1/27/25 1430)   pantoprazole (PROTONIX) EC tablet 40 mg (40 mg Oral Not Given 1/27/25 0522)   guaifenesin (ROBITUSSIN) 100 MG/5ML liquid 400 mg (400 mg Oral Given 1/27/25 1251)   sodium chloride 0.9 % bolus 500 mL (500 mL Intravenous New Bag 1/25/25 2316)   haloperidol lactate (HALDOL) injection 2 mg (2 mg Intravenous Given 1/26/25 1715)   LORazepam (ATIVAN) injection 0.5 mg (0.5 mg Intravenous Given 1/26/25 1831)   barium sulfate (VARIBAR THIN LIQUID) oral suspension 55 mL (55 mL Oral Given 1/27/25 1311)   barium sulfate oral suspension 50 mL (50 mL Oral Given 1/27/25 1310)    barium sulfate oral suspension 50 mL (50 mL Oral Given 1/27/25 1308)        ED Course:    ED Course as of 01/27/25 1458   Sat Jan 25, 2025   1904 EKG:    Rhythm: Sinus rhythm  Rate: 89  Intervals: Normal AR and QT interval  LVH by criteria in aVL  T-wave: No pathological T wave  ST Segment: Nonspecific ST segments in V3, V4, V5, probably related to left ventricular hypertrophy.  There is no obvious STEMI    EKG Comparison: No EKG available for comparison    Interpreted by me   [SD]      ED Course User Index  [SD] Devin Adkins DO       Labs:    Lab Results (last 24 hours)       Procedure Component Value Units Date/Time    CBC & Differential [[id number]]  (Abnormal) Collected: 01/27/25 1152    Specimen: Blood from Arm, Left Updated: 01/27/25 1202    Narrative:      The following orders were created for panel order CBC & Differential.  Procedure                               Abnormality         Status                     ---------                               -----------         ------                     CBC Auto Differential[[id number]]        Abnormal            Final result                 Please view results for these tests on the individual orders.    Comprehensive Metabolic Panel [[id number]]  (Abnormal) Collected: 01/27/25 1152    Specimen: Blood from Arm, Left Updated: 01/27/25 1223     Glucose 83 mg/dL      BUN 18 mg/dL      Creatinine 0.86 mg/dL      Sodium 141 mmol/L      Potassium 4.8 mmol/L      Chloride 109 mmol/L      CO2 21.1 mmol/L      Calcium 8.6 mg/dL      Total Protein 6.5 g/dL      Albumin 3.0 g/dL      ALT (SGPT) 7 U/L      AST (SGOT) 14 U/L      Alkaline Phosphatase 58 U/L      Total Bilirubin 0.5 mg/dL      Globulin 3.5 gm/dL      A/G Ratio 0.9 g/dL      BUN/Creatinine Ratio 20.9     Anion Gap 10.9 mmol/L      eGFR 63.9 mL/min/1.73     Narrative:      GFR Categories in Chronic Kidney Disease (CKD)      GFR Category          GFR (mL/min/1.73)    Interpretation  G1                     90 or  greater         Normal or high (1)  G2                      60-89                Mild decrease (1)  G3a                   45-59                Mild to moderate decrease  G3b                   30-44                Moderate to severe decrease  G4                    15-29                Severe decrease  G5                    14 or less           Kidney failure          (1)In the absence of evidence of kidney disease, neither GFR category G1 or G2 fulfill the criteria for CKD.    eGFR calculation 2021 CKD-EPI creatinine equation, which does not include race as a factor    Magnesium [[id number]]  (Normal) Collected: 01/27/25 1152    Specimen: Blood from Arm, Left Updated: 01/27/25 1223     Magnesium 1.9 mg/dL     CBC Auto Differential [[id number]]  (Abnormal) Collected: 01/27/25 1152    Specimen: Blood from Arm, Left Updated: 01/27/25 1202     WBC 7.89 10*3/mm3      RBC 5.12 10*6/mm3      Hemoglobin 14.7 g/dL      Hematocrit 45.4 %      MCV 88.7 fL      MCH 28.7 pg      MCHC 32.4 g/dL      RDW 15.5 %      RDW-SD 50.2 fl      MPV 11.1 fL      Platelets 213 10*3/mm3      Neutrophil % 79.6 %      Lymphocyte % 11.0 %      Monocyte % 8.9 %      Eosinophil % 0.0 %      Basophil % 0.1 %      Immature Grans % 0.4 %      Neutrophils, Absolute 6.28 10*3/mm3      Lymphocytes, Absolute 0.87 10*3/mm3      Monocytes, Absolute 0.70 10*3/mm3      Eosinophils, Absolute 0.00 10*3/mm3      Basophils, Absolute 0.01 10*3/mm3      Immature Grans, Absolute 0.03 10*3/mm3      nRBC 0.0 /100 WBC              Imaging:    No Radiology Exams Resulted Within Past 24 Hours      Differential Diagnosis and Discussion:    Weakness: Based on the patient's history, signs, and symptoms, the diffential diagnosis includes but is not limited to meningitis, stroke, sepsis, subarachnoid hemorrhage, intracranial bleeding, encephalitis, acute uti, dehydration, MS, myasthenia gravis, Guillan [place], migraine variant, neuromuscular disorders vertigo, electrolyte  imbalance, and metabolic disorders.    Labs were collected in the emergency department and all labs were reviewed and interpreted by me.  X-ray were performed in the emergency department and all X-ray impressions were independently interpreted by me.  An EKG was performed and the EKG was interpreted by me.    MDM  Number of Diagnoses or Management Options  Acute kidney injury  COVID-19  Dehydration  Moderate dementia without behavioral disturbance, psychotic disturbance, mood disturbance, or anxiety, unspecified dementia type  Oropharyngeal dysphagia  Physical deconditioning  Diagnosis management comments: The patient's CBC was reviewed and shows no abnormalities of critical concern.  Of note, there is no anemia requiring a blood transfusion and the platelet count is acceptable      The patient's CMP was reviewed and shows no abnormalities of critical concern.  Of note, the patient's sodium and potassium are acceptable.  The patient's liver enzymes are unremarkable.  The patient has a normal anion gap.  The patient's BUN was 34 the patient's creatinine is 1.18.  This is a slight change from the patient's previous chemistry.  Negative the patient is dehydrated and has mild acute kidney injury.    The patient tested positive for COVID-19.    The patient's urinalysis was negative for obvious infection or blood    The patient's procalcitonin was normal which makes sepsis unlikely    Chest x-ray was performed while in the emergency room.  The chest x-ray demonstrated no acute cardiopulmonary process              The nursing staff straight cath the patient.  There was minimal amount of urine in the bladder.  There was not enough for a sample.  I will give the patient a 500 cc bolus and start the patient on maintenance IV fluid    The nursing staff began to p.o. challenge patient.  According to the nursing staff when given Sprite she started to choke and was spitting it up.  At this point, I do not feel the patient can go  home.  Family reports worsening dysphagia over a month.  She has had worsening weakness and deconditioning.  The patient appears to be dehydrated and have some mild acute kidney injury from their last chemistry panel       Amount and/or Complexity of Data Reviewed  Clinical lab tests: reviewed and ordered  Tests in the radiology section of CPT®: reviewed and ordered  Tests in the medicine section of CPT®: ordered and reviewed  Decide to obtain previous medical records or to obtain history from someone other than the patient: yes  Discuss the patient with other providers: yes (23:43 EST  I discussed the case with the hospitalist, Dr. Abreu.  We have discussed the patient's presenting symptoms, laboratory values, imaging and condition at the time of admission.  They will evaluate the patient in the emergency room and admit the patient to the hospital)               Social Determinants of Health:    Patient has presented with family members who are responsible, reliable and will ensure follow up care.      Disposition and Care Coordination:    Admit:   Through independent evaluation of the patient's history, physical, and imperical data, the patient meets criteria for inpatient admission to the hospital.        Final diagnoses:   COVID-19   Oropharyngeal dysphagia   Dehydration   Acute kidney injury   Moderate dementia without behavioral disturbance, psychotic disturbance, mood disturbance, or anxiety, unspecified dementia type   Physical deconditioning        ED Disposition       ED Disposition   Decision to Admit    Condition   --    Comment   Level of Care: Med/Surg [1]   Diagnosis: Failure to thrive in adult [916746]   Admitting Physician: RADHA VERONICA [[id number]]   Certification: I Certify That Inpatient Hospital Services Are Medically Necessary For Greater Than 2 Midnights                 This medical record created using voice recognition software.             Devin Adkins DO  01/27/25 1385

## 2025-01-26 NOTE — H&P
[facility] HISTORY AND PHYSICAL  Date: 2025   Patient Name: Colleen Gates  : 1933  MRN: [id number]  Primary Care Physician:  Daniel Ramirez Jr., MD  Date of admission: 2025    Subjective   Subjective     Chief Complaint: Weakness    HPI:    Colleen Gates is a 89 y.o. female with past medical history of GERD presents to the ED due to generalized weakness and difficulty swallowing.  As per family, patient has been dealing with generalized weakness and cough for the past month.  In the past few days patient been having some nausea vomiting and difficulty swallowing.  Patient was recently discharged on January 10 from [place] for generalized weakness and dehydration.  In the ED, patient's vitals showed temperature 98.3, heart rate 92 and blood pressure 119/98.  Patient's vitals show troponin 34, creatinine 1.18, sodium 143, calcium 9.4 and hemoglobin 13.8.  COVID-positive.  Patient admitted to floors for further management.    Personal History     Past Medical History:  Past Medical History:   Diagnosis Date    GERD (gastroesophageal reflux disease)        Past Surgical History:  No past surgical history on file.    Family History:   No family history on file.    Social History:   Social History     Socioeconomic History    Marital status:    Tobacco Use    Smoking status: Never     Passive exposure: Never    Smokeless tobacco: Never   Vaping Use    Vaping status: Never Used   Substance and Sexual Activity    Alcohol use: Never    Drug use: Never       Home Medications:  acetaminophen and omeprazole    Allergies:  No Known Allergies    Review of Systems   All systems were reviewed and negative except for: Above    Objective   Objective     Vitals:   Temp:  [98.3 °F (36.8 °C)] 98.3 °F (36.8 °C)  Heart Rate:  [87-93] 92  Resp:  [18-20] 18  BP: (101-129)/(66-98) 119/98    Physical Exam    Constitutional: Awake, alert, frail   Eyes: Pupils equal, sclerae anicteric, no  conjunctival injection   HENT: NCAT, mucous membranes moist   Neck: Supple, no thyromegaly, no lymphadenopathy, trachea midline   Respiratory: Rhonchi noted bilaterally   Cardiovascular: RRR, no murmurs, rubs, or gallops, palpable pedal pulses bilaterally   Gastrointestinal: Positive bowel sounds, soft, nontender, nondistended   Musculoskeletal: No bilateral ankle edema, no clubbing or cyanosis to extremities   Psychiatric: Appropriate affect, cooperative   Neurologic: Oriented x 3, strength symmetric in all extremities, Cranial Nerves grossly intact to confrontation, speech clear   Skin: No rashes     Result Review    Result Review:  I have personally reviewed the results from the time of this admission to 1/26/2025 00:02 EST and agree with these findings:  [x]  Laboratory  []  Microbiology  [x]  Radiology  []  EKG/Telemetry   []  Cardiology/Vascular   []  Pathology  []  Old records  []  Other:      Assessment & Plan   Assessment / Plan     Assessment/Plan:     Assessment  Intractable nausea and vomiting  Dysphagia  TONYA  Failure to thrive  COVID-positive  GERD  Essential hypertension  Severe protein calorie malnutrition    Plan  Admit to [facility]  Monitor vitals  CBC BMP  Chest x-ray reviewed  IV Rocephin azithromycin  Fluids  Nutrition consulted  Speech therapy consult  PT OT consulted    VTE Prophylaxis:  Mechanical VTE prophylaxis orders are present.        CODE STATUS:         Admission Status:  I believe this patient meets inpatient status.    Electronically signed by Vinnie Mckeon MD, 01/26/25, 12:02 AM EST.

## 2025-01-26 NOTE — THERAPY EVALUATION
Acute Care - Speech Language Pathology   Swallow Initial Evaluation  Marie     Patient Name: Colleen Gates  : 1933  MRN: [id number]  Today's Date: 2025               Admit Date: 2025    Visit Dx:     ICD-10-CM ICD-9-CM   1. COVID-19  U07.1 079.89   2. Oropharyngeal dysphagia  R13.12 787.22   3. Dehydration  E86.0 276.51   4. Acute kidney injury  N17.9 584.9   5. Moderate dementia without behavioral disturbance, psychotic disturbance, mood disturbance, or anxiety, unspecified dementia type  F03.B0 294.20   6. Physical deconditioning  R53.81 799.3     Patient Active Problem List   Diagnosis    Fall    Failure to thrive in adult     Past Medical History:   Diagnosis Date    GERD (gastroesophageal reflux disease)      History reviewed. No pertinent surgical history.    SLP Recommendation and Plan          Inpatient Speech Pathology Dysphagia Evaluation        PAIN SCALE: Indicated    PRECAUTIONS/CONTRAINDICATIONS: Enhanced airborne    SUSPECTED ABUSE/NEGLECT/EXPLOITATION: None noted    SOCIAL/PSYCHOLOGICAL NEEDS/BARRIERS: None noted    PAST SOCIAL HISTORY: 89-year-old female, lives at home with her     PRIOR FUNCTION: Reportedly on the regular diet however recently with nausea and vomiting and difficulty swallowing.    PATIENT GOALS/EXPECTATIONS: Patient wants something to drink    HISTORY: 89-year-old female with past medical history of GERD, generalized weakness and swallowing difficulty.  Family reports she has been dealing with weakness and cough for the past month.  She was recently discharged on January 10 from [facility] for generalized weakness and dehydration.  She is currently COVID-19 positive.    CURRENT DIET LEVEL: N.p.o.    OBJECTIVE:    TEST ADMINISTERED: Clinical dysphagia evaluation    COGNITION/SAFETY AWARENESS: Not thoroughly evaluated    BEHAVIORAL OBSERVATIONS: Awake, cooperative, follows simple commands    ORAL MOTOR EXAM: Natural dentition however several  missing.  Dry oral mucosa.  Generalized decreased oral motor strength/range of motion 3+/5    VOICE QUALITY: Weak    REFLEX EXAM: Productive    POSTURE: Total assist    FEEDING/SWALLOWING FUNCTION: Assessed with ice chips, thin liquids, nectar thick liquids, purée solids, soft to chew solids    CLINICAL OBSERVATIONS: Oral care provided prior to evaluation.  Laryngeal sounds clear to cervical auscultation prior to administration of any consistencies.  Single ice chips with patient manipulating in oral cavity.  Swallow completed.  Double swallow observed.  Laryngeal sounds clear to auscultation.  Spoon trials of thin liquids with delayed swallow completed.  Multiple swallows, laryngeal wetness, wet cough.  Nectar thick liquid trials by spoon with mildly delayed swallow completed.  Double swallow observed however vocal quality and laryngeal sounds remained clear.  Trials of nectar thick via controlled cup drink and controlled straw drink.  Swallows completed with mild delay.  Throat clear x 1 with straw drink however laryngeal sounds clear to auscultation.  Purée solids with swallow completed.  Double swallow.  Soft to chew solids with extended chewing.  Swallow completed with delayed.  Multiple swallows, coughing, patient regurgitating consistency into emesis bag.  Repeat trial of nectar thick and purée with swallows completed with mild delay.  Vocal quality and laryngeal sounds clear.  Throat clearing was observed x 1 with purées.  Laryngeal elevation noted to palpation at 4+/5 range of motion.  Patient exhibiting signs of aspiration with thin liquids, soft to chew solids.  No overt signs of aspiration with purées and nectar thick however concern for pharyngeal weakness and possible silent aspiration.  Silent aspiration cannot be ruled out at bedside.    DYSPHAGIA CRITERIA: Risk of aspiration    FUNCTIONAL ASSESSMENT INSTRUMENT: Patient currently scored a level 4 of 7 on Functional Communication Measures for  swallowing indicating a 40-59% limitation in function.    ASSESSMENT/ PLAN OF CARE:  Pt presents with limitations, noted below, that impede patient's ability to tolerate least restrictive diet safely and independently. The skills of a therapist will be required to safely and effectively implement the following treatment plan to restore maximal level of function.    PROBLEMS:  1.  Risk of aspiration, swallow delay, concern for pharyngeal weakness   LTG 1: 30 days.  Patient will increase functional communication measures for swallowing to level 5 of 7, indicating a 20-39% limitation in function.   STG 1a: 14 days.  Patient will tolerate trials of nectar thickened liquids with minimal to no signs or symptoms of aspiration.   STG 1b: 14 days.  Patient will tolerate trials of purée solids with minimal to no signs or symptoms of aspiration.   STG 1c: 14 days.  Patient will utilize effortful swallow for improved pharyngeal strength and clearance with minimal cueing.   STG 1d: 14 days.  Patient will complete modified barium swallow study for further investigation of swallow function and risk of aspiration.   TREATMENT: Speech therapy for dysphagia, education of strategies, tolerance of trials of p.o. intake.      FREQUENCY/DURATION: Daily, 5 days a week    REHAB POTENTIAL:  Pt has good/fair rehab potential.  The following limitations may influence improvement/ length of tx medical status.    RECOMMENDATIONS:   1.   DIET: Full liquid, nectar thick consistency    2.  POSITION: Fully upright for all p.o. intake, 30 minutes following    3.  COMPENSATORY STRATEGIES: Assist for feeding, single controlled sips at a slow rate.  Cue for double swallow, meds crushed in small amount of applesauce.    4.  Modified barium swallow study for further investigation of swallow function, pharyngeal strengthening and evaluate for any silent aspiration.    Pt/responsible party agrees with plan of care and has been informed of all alternatives,  risks and benefits.                    Anticipated Discharge Disposition (SLP): anticipate therapy at next level of care (01/26/25 1029)                                                               EDUCATION  The patient has been educated in the following areas:   Dysphagia (Swallowing Impairment).                Time Calculation:    Time Calculation- SLP       Row Name 01/26/25 1029             Time Calculation- SLP    SLP Start Time 0900  -SN      SLP Stop Time 1000  -SN      SLP Time Calculation (min) 60 min  -SN      SLP Received On 01/26/25  -SN         Untimed Charges    55640-FI Eval Oral Pharyng Swallow Minutes 60  -SN         Total Minutes    Untimed Charges Total Minutes 60  -SN       Total Minutes 60  -SN                User Key  (r) = Recorded By, (t) = Taken By, (c) = Cosigned By      Initials Name Provider Type    Shelly Doan MS-CCC/SLP, ANGIE Speech and Language Pathologist                    Therapy Charges for Today       Code Description Service Date Service Provider Modifiers Qty    28922654707 HC ST EVAL ORAL PHARYNG SWALLOW 4 1/26/2025 Shelly Calabrese MS-CCC/SLP, CNT GN 1                 OTIS Palacios/ANGIE BETH  1/26/2025

## 2025-01-27 ENCOUNTER — APPOINTMENT (OUTPATIENT)
Dept: GENERAL RADIOLOGY | Facility: HOSPITAL | Age: OVER 89
End: 2025-01-27
Payer: MEDICARE

## 2025-01-27 PROBLEM — E43 SEVERE PROTEIN-CALORIE MALNUTRITION: Status: ACTIVE | Noted: 2025-01-27

## 2025-01-27 LAB
ALBUMIN SERPL-MCNC: 3 G/DL (ref 3.5–5.2)
ALBUMIN/GLOB SERPL: 0.9 G/DL
ALP SERPL-CCNC: 58 U/L (ref 39–117)
ALT SERPL W P-5'-P-CCNC: 7 U/L (ref 1–33)
ANION GAP SERPL CALCULATED.3IONS-SCNC: 10.9 MMOL/L (ref 5–15)
AST SERPL-CCNC: 14 U/L (ref 1–32)
BASOPHILS # BLD AUTO: 0.01 10*3/MM3 (ref 0–0.2)
BASOPHILS NFR BLD AUTO: 0.1 % (ref 0–1.5)
BILIRUB SERPL-MCNC: 0.5 MG/DL (ref 0–1.2)
BUN SERPL-MCNC: 18 MG/DL (ref 8–23)
BUN/CREAT SERPL: 20.9 (ref 7–25)
CALCIUM SPEC-SCNC: 8.6 MG/DL (ref 8.2–9.6)
CHLORIDE SERPL-SCNC: 109 MMOL/L (ref 98–107)
CO2 SERPL-SCNC: 21.1 MMOL/L (ref 22–29)
CREAT SERPL-MCNC: 0.86 MG/DL (ref 0.57–1)
DEPRECATED RDW RBC AUTO: 50.2 FL (ref 37–54)
EGFRCR SERPLBLD CKD-EPI 2021: 63.9 ML/MIN/1.73
EOSINOPHIL # BLD AUTO: 0 10*3/MM3 (ref 0–0.4)
EOSINOPHIL NFR BLD AUTO: 0 % (ref 0.3–6.2)
ERYTHROCYTE [DISTWIDTH] IN BLOOD BY AUTOMATED COUNT: 15.5 % (ref 12.3–15.4)
GLOBULIN UR ELPH-MCNC: 3.5 GM/DL
GLUCOSE SERPL-MCNC: 83 MG/DL (ref 65–99)
HCT VFR BLD AUTO: 45.4 % (ref 34–46.6)
HGB BLD-MCNC: 14.7 G/DL (ref 12–15.9)
IMM GRANULOCYTES # BLD AUTO: 0.03 10*3/MM3 (ref 0–0.05)
IMM GRANULOCYTES NFR BLD AUTO: 0.4 % (ref 0–0.5)
LYMPHOCYTES # BLD AUTO: 0.87 10*3/MM3 (ref 0.7–3.1)
LYMPHOCYTES NFR BLD AUTO: 11 % (ref 19.6–45.3)
MAGNESIUM SERPL-MCNC: 1.9 MG/DL (ref 1.7–2.3)
MCH RBC QN AUTO: 28.7 PG (ref 26.6–33)
MCHC RBC AUTO-ENTMCNC: 32.4 G/DL (ref 31.5–35.7)
MCV RBC AUTO: 88.7 FL (ref 79–97)
MONOCYTES # BLD AUTO: 0.7 10*3/MM3 (ref 0.1–0.9)
MONOCYTES NFR BLD AUTO: 8.9 % (ref 5–12)
NEUTROPHILS NFR BLD AUTO: 6.28 10*3/MM3 (ref 1.7–7)
NEUTROPHILS NFR BLD AUTO: 79.6 % (ref 42.7–76)
NRBC BLD AUTO-RTO: 0 /100 WBC (ref 0–0.2)
PLATELET # BLD AUTO: 213 10*3/MM3 (ref 140–450)
PMV BLD AUTO: 11.1 FL (ref 6–12)
POTASSIUM SERPL-SCNC: 4.8 MMOL/L (ref 3.5–5.2)
PROT SERPL-MCNC: 6.5 G/DL (ref 6–8.5)
RBC # BLD AUTO: 5.12 10*6/MM3 (ref 3.77–5.28)
SODIUM SERPL-SCNC: 141 MMOL/L (ref 136–145)
WBC NRBC COR # BLD AUTO: 7.89 10*3/MM3 (ref 3.4–10.8)

## 2025-01-27 PROCEDURE — 83735 ASSAY OF MAGNESIUM: CPT | Performed by: INTERNAL MEDICINE

## 2025-01-27 PROCEDURE — 74230 X-RAY XM SWLNG FUNCJ C+: CPT

## 2025-01-27 PROCEDURE — 25010000002 CEFTRIAXONE PER 250 MG: Performed by: STUDENT IN AN ORGANIZED HEALTH CARE EDUCATION/TRAINING PROGRAM

## 2025-01-27 PROCEDURE — 85025 COMPLETE CBC W/AUTO DIFF WBC: CPT | Performed by: INTERNAL MEDICINE

## 2025-01-27 PROCEDURE — 97161 PT EVAL LOW COMPLEX 20 MIN: CPT

## 2025-01-27 PROCEDURE — 80053 COMPREHEN METABOLIC PANEL: CPT | Performed by: INTERNAL MEDICINE

## 2025-01-27 PROCEDURE — 99232 SBSQ HOSP IP/OBS MODERATE 35: CPT | Performed by: INTERNAL MEDICINE

## 2025-01-27 PROCEDURE — 92526 ORAL FUNCTION THERAPY: CPT

## 2025-01-27 PROCEDURE — 92611 MOTION FLUOROSCOPY/SWALLOW: CPT

## 2025-01-27 RX ORDER — GUAIFENESIN 200 MG/10ML
400 LIQUID ORAL 3 TIMES DAILY
Status: DISCONTINUED | OUTPATIENT
Start: 2025-01-27 | End: 2025-01-30

## 2025-01-27 RX ADMIN — AZITHROMYCIN DIHYDRATE 500 MG: 250 TABLET ORAL at 09:46

## 2025-01-27 RX ADMIN — GUAIFENESIN 400 MG: 100 LIQUID ORAL at 20:39

## 2025-01-27 RX ADMIN — GUAIFENESIN 400 MG: 100 LIQUID ORAL at 12:51

## 2025-01-27 RX ADMIN — BARIUM SULFATE 50 ML: 400 SUSPENSION ORAL at 13:10

## 2025-01-27 RX ADMIN — POLYETHYLENE GLYCOL 3350 17 G: 17 POWDER, FOR SOLUTION ORAL at 09:46

## 2025-01-27 RX ADMIN — BARIUM SULFATE 55 ML: 0.81 POWDER, FOR SUSPENSION ORAL at 13:11

## 2025-01-27 RX ADMIN — Medication 10 ML: at 20:39

## 2025-01-27 RX ADMIN — SODIUM CHLORIDE 1000 MG: 9 INJECTION INTRAMUSCULAR; INTRAVENOUS; SUBCUTANEOUS at 20:38

## 2025-01-27 RX ADMIN — BARIUM SULFATE 50 ML: 400 SUSPENSION ORAL at 13:08

## 2025-01-27 RX ADMIN — GUAIFENESIN 400 MG: 100 LIQUID ORAL at 17:28

## 2025-01-27 NOTE — PROGRESS NOTES
[facility]   Hospitalist Progress Note  Date: 2025  Patient Name: Colleen Gates  : 1933  MRN: [id number]  Date of admission: 2025      Subjective   Subjective     Chief Complaint: Weakness    Summary: Colleen Gates is a 89 y.o. female with past medical history of GERD presents to the ED due to generalized weakness and difficulty swallowing.  As per family, patient has been dealing with generalized weakness and cough for the past month.  In the past few days patient been having some nausea vomiting and difficulty swallowing.  Patient was recently discharged on January 10 from [facility] for generalized weakness and dehydration.  In the ED, patient's vitals showed temperature 98.3, heart rate 92 and blood pressure 119/98.  Patient's vitals show troponin 34, creatinine 1.18, sodium 143, calcium 9.4 and hemoglobin 13.8.  COVID-positive.  Patient admitted to floors for further management.     Interval Followup: Seen and examined patient this morning.  Patient with intermittent confusion and combativeness overnight.  Patient's  is a patient in the same room with her.  Patient remains weak.  Denies shortness of breath.    Objective   Objective     Vitals:   Temp:  [97.7 °F (36.5 °C)-97.9 °F (36.6 °C)] 97.8 °F (36.6 °C)  Heart Rate:  [72-95] 90  Resp:  [18] 18  BP: (126-171)/(62-82) 171/82    Physical Exam   GEN: No acute distress.  Generalized weakness with intermittent confusion  HEENT: Moist mucous membranes  LUNGS: Equal chest rise bilaterally  CARDIAC: Regular rate and rhythm  NEURO: Moving all 4 extremities spontaneously  SKIN: No obvious breakdown.  cyanosis noted on fingertips       Result Review    Result Review:  I have personally reviewed the results from the time of this admission to 2025 13:00 EST and agree with these findings:  []  Laboratory  []  Microbiology  []  Radiology  []  EKG/Telemetry   []  Cardiology/Vascular   []  Pathology  []  Old records  []   Other:    Assessment & Plan   Assessment / Plan     Assessment:  Intractable nausea and vomiting  Dysphagia  TONYA  Failure to thrive  COVID-positive  GERD  Essential hypertension  Severe protein calorie malnutrition      Plan:  Continue to monitor in the hospital for workup and management of the above   Continue Rocephin and azithromycin  Speech consulted-- pending modified barium swallow study  CXR reviewed  Continue fluids  PT/OT  CBC, CMP reviewed       Discussed plan with RN.    VTE Prophylaxis:  Mechanical VTE prophylaxis orders are present.        CODE STATUS:   Medical Intervention Limits: No intubation (DNI)  Code Status (Patient has no pulse and is not breathing): No CPR (Do Not Attempt to Resuscitate)  Medical Interventions (Patient has pulse or is breathing): Limited Support        Electronically signed by Carline Foley PA-C, 01/27/25, 1:00 PM EST.    Patient independently seen and evaluated, agree with assessment and plan, above documentation reflects plan put forth during bedside rounds.  More than 51% of the time of this patient encounter was performed by me.    Interval history;  No acute events overnight, patient now voiding bladder with improvement    GEN: No acute distress  HEENT: Moist mucous membranes  LUNGS: Equal chest rise bilaterally  CARDIAC: Regular rate and rhythm  NEURO: Moving all 4 extremities spontaneously  SKIN: No obvious breakdown    Plan:  Agree with assessment and plan as above  Patient weak and debilitated  Patient with poor ability to swallow and does have some aspiration on modified barium  For now we will keep patient on current diet as x-ray does not seem too bad  Feels that placing a core track in this patient would be more detrimental  Continue to encourage oral intake  Chest x-ray reviewed  Continue Rocephin and azithromycin for now  Continue to work with speech therapy  CBC, CMP reviewed  Repeat CBC, CMP, mag and Phos in a.m.      Electronically signed by Mega WARD  MD Flaco, 1/27/2025, 13:39 EST.

## 2025-01-27 NOTE — MBS/VFSS/FEES
Acute Care - Speech Language Pathology   Swallow  modified barium swallow   Marie     Patient Name: Colleen Can  : 1933  MRN: [id number]  Today's Date: 2025               Admit Date: 2025    Visit Dx:     ICD-10-CM ICD-9-CM   1. COVID-19  U07.1 079.89   2. Oropharyngeal dysphagia  R13.12 787.22   3. Dehydration  E86.0 276.51   4. Acute kidney injury  N17.9 584.9   5. Moderate dementia without behavioral disturbance, psychotic disturbance, mood disturbance, or anxiety, unspecified dementia type  F03.B0 294.20   6. Physical deconditioning  R53.81 799.3     Patient Active Problem List   Diagnosis    Fall    Failure to thrive in adult    Severe protein-calorie malnutrition     Past Medical History:   Diagnosis Date    GERD (gastroesophageal reflux disease)      History reviewed. No pertinent surgical history.    SLP Recommendation and Plan      MODIFIED BARIUM SWALLOW STUDY: SPEECH PATHOLOGY REPORT        DATE OF SERVICE: 2025    PERTINENT INFORMATION:  Ms. can is a 89 year old female with dysphagia.    She was referred for an MBSS by Dr. Sam to rule out aspiration as well as to determine appropriate treatment plan for this patient.      PROCEDURE:    Ms. can was alert and cooperative.  The patient was viewed in lateral plane.  The following Ba consistencies were administered: Thin barium, nectar thick barium, honey thick barium, barium mixed applesauce.  The following compensatory swallowing strategies were performed: Bolus modification, cyclic ingestion, effortful swallow.      RESULTS:    1.  Spoon nectar thick. Premature spillage to vallecula. Swallow completed with incomplete deflection of epiglottis resulting in reside in vallecula and piriforms. Laryngeal penetration occurring. Cued for second, effortful swallow with partial residue clearance.   2. Puree by spoon (1/2tsp). Lingual pumping with spill to vallecula. Swallow completed with incomplete deflection of  epiglottis.Residue in vallecula and piriforms. Second swallow partially clears.   3. Thin liquid by spoon. Spill to pharynx and into laryngeal vestibule. Aspiration occurring prior to swallow completion. Coughing observed.   4. Honey thick liquid by spoon. Spill to vallecula and into piriforms. Swallow completed with incomplete deflection of epiglottis. Reside in vallecula and piriforms. Second swallow partially clears.   5. 1tsp size of pureed. Lingual pumping with spill to vallecula. Swallow completed with residue in vallecula and piriforms. Second swallow partially clears. Small amount of residue spillover from piriforms resulting in aspiration. Patient eliciting cough.         IMPRESSIONS:    Ms Gates demonstrated oral pharyngeal dysphagia characterized by swallow delay, decreased tongue base strength, decreased laryngeal elevation, decreased pharyngeal strength and incomplete deflection of epiglottis. Laryngeal penetration with nectar thick liquid, aspiration with thin liquid by spoon. Small amount of aspiration with residues from 1tsp bolus of pureed. Ms Yajaira did elicit cough reflex with aspiration events. No aspiration observed with 1/2tsp pureed or honey thick by spoon 1/2tsp nectar thick.       RECOMMENDATIONS:   1.  Discussed results with Dr. Sam. Patient is at risk of aspiration and malnutrition however due to her overall medical status physician wanting to hold on cortrak placement. Recommend continuing current diet of full liquid, nectar thick and monitor patient closely. Will continue speech pathology services for dysphagia.   2.  Positioning: fully upright for all po, 30 minutes following.   3.  Compensatory strategies: total assist for feeding, po 1/2tsp bolus size or less, spoon fed nectar thick, cue for double swallow, effortful swallow.         Yes, Patient/responsible party agrees with the plan of care and has been informed of all alternatives, risks and benefits.    Thank you for this  referral.                                                                                    EDUCATION  The patient has been educated in the following areas:   Dysphagia (Swallowing Impairment).                Time Calculation:    Time Calculation- SLP       Row Name 01/27/25 1141             Time Calculation- SLP    SLP Stop Time 0700  -SN      SLP Received On 01/27/25  -SN         Untimed Charges    [id number]-NM Treatment Swallow Minutes 45  -SN         Total Minutes    Untimed Charges Total Minutes 45  -SN       Total Minutes 45  -SN                User Key  (r) = Recorded By, (t) = Taken By, (c) = Cosigned By      Initials Name Provider Type    Shelly Doan MS-CCC/SLP, ANGIE Speech and Language Pathologist                    Therapy Charges for Today       Code Description Service Date Service Provider Modifiers Qty    [id number] HC ST EVAL ORAL PHARYNG SWALLOW 4 1/26/2025 Shelly Calabrese MS-CCC/SLP, CNT GN 1    [id number] HC ST TREATMENT SWALLOW 3 1/27/2025 Shelly Calabrese MS-CCC/SLP, ANGIE GN 1    [id number] HC ST MOTION FLUORO EVAL SWALLOW 6 1/27/2025 Shelly Calabrese MS-CCC/SLP, ANGIE GN 1                 OTIS Palacios/ANGIE BETH  1/27/2025

## 2025-01-27 NOTE — THERAPY EVALUATION
Acute Care - Physical Therapy Initial Evaluation   Marie     Patient Name: Colleen Gates  : 1933  MRN: [id number]  Today's Date: 2025      Visit Dx:     ICD-10-CM ICD-9-CM   1. COVID-19  U07.1 079.89   2. Oropharyngeal dysphagia  R13.12 787.22   3. Dehydration  E86.0 276.51   4. Acute kidney injury  N17.9 584.9   5. Moderate dementia without behavioral disturbance, psychotic disturbance, mood disturbance, or anxiety, unspecified dementia type  F03.B0 294.20   6. Physical deconditioning  R53.81 799.3   7. Difficulty walking  R26.2 719.7     Patient Active Problem List   Diagnosis    Fall    Failure to thrive in adult    Severe protein-calorie malnutrition     Past Medical History:   Diagnosis Date    GERD (gastroesophageal reflux disease)      History reviewed. No pertinent surgical history.  PT Assessment (Last 12 Hours)       PT Evaluation and Treatment       Row Name 25 1500          Physical Therapy Time and Intention    Document Type evaluation  -AV     Mode of Treatment individual therapy;physical therapy  -AV       Row Name 25 1500          General Information    Patient Profile Reviewed yes  -AV     Patient Observations alert;cooperative;agree to therapy  -AV     Prior Level of Function --  Reports (I) with ADLs. Ambulated with standard walker. No home O2.  -AV     Equipment Currently Used at Home walker, standard  -AV     Existing Precautions/Restrictions fall  -AV       Row Name 25 1500          Living Environment    Current Living Arrangements home  -AV     Home Accessibility stairs to enter home  -AV     People in Home spouse  Currently also admitted, in the same room with patient  -AV       Row Name 25 1500          Home Main Entrance    Number of Stairs, Main Entrance three  -AV       Row Name 25 1500          Cognition    Orientation Status (Cognition) oriented to;person;place  -AV       Row Name 25 1500          Range of Motion (ROM)    Range of  Motion bilateral lower extremities;ROM is WFL  -AV       Row Name 01/27/25 1500          Strength (Manual Muscle Testing)    Strength (Manual Muscle Testing) bilateral lower extremities;strength is WFL  -AV       Row Name 01/27/25 1500          Bed Mobility    Bed Mobility supine-sit;sit-supine  -AV     Supine-Sit [place] (Bed Mobility) contact guard  -AV     Sit-Supine [place] (Bed Mobility) contact guard  -AV       Row Name 01/27/25 1500          Transfers    Transfers sit-stand transfer;stand-sit transfer  -AV       Row Name 01/27/25 1500          Sit-Stand Transfer    Sit-Stand [place] (Transfers) contact guard  -AV     Assistive Device (Sit-Stand Transfers) walker, front-wheeled  -AV       Row Name 01/27/25 1500          Stand-Sit Transfer    Stand-Sit [place] (Transfers) contact guard  -AV     Assistive Device (Stand-Sit Transfers) walker, front-wheeled  -AV       Row Name 01/27/25 1500          Gait/Stairs (Locomotion)    Gait/Stairs Locomotion gait/ambulation independence;distance ambulated;gait/ambulation assistive device  -AV     [place] Level (Gait) contact guard  -AV     Assistive Device (Gait) walker, front-wheeled  -AV     Distance in Feet (Gait) 30  -AV     Deviations/Abnormal Patterns (Gait) gait speed decreased;stride length decreased  -AV       Row Name 01/27/25 1500          Safety Issues/Impairments Affecting Functional Mobility    Impairments Affecting Function (Mobility) balance;cognition;endurance/activity tolerance;shortness of breath  -AV       Row Name 01/27/25 1500          Balance    Balance Assessment standing dynamic balance  -AV     Dynamic Standing Balance contact guard  -AV     Position/Device Used, Standing Balance supported;walker, front-wheeled  -AV       Row Name 01/27/25 1500          Plan of Care Review    Plan of Care Reviewed With patient;spouse  -AV     Progress no change  -AV     Outcome Evaluation Patient presents with deficits in balance,  endurance, transfers, and ambulation. Patient will benefit from skilled PT services to address these mobility deficits and decrease risk of falls.  -AV       Row Name 01/27/25 1500          Positioning and Restraints    Pre-Treatment Position in bed  -AV     Post Treatment Position bed  -AV     In Bed fowlers;call light within reach;encouraged to call for assist;with family/caregiver  No alarms active upon entr. Nurse notified, aware  -AV       Row Name 01/27/25 1500          Therapy Assessment/Plan (PT)    Rehab Potential (PT) good  -AV     Criteria for Skilled Interventions Met (PT) yes;meets criteria  -AV     Therapy Frequency (PT) daily  -AV     Predicted Duration of Therapy Intervention (PT) 10 days  -AV     Problem List (PT) problems related to;balance;cognition;mobility  -AV     Activity Limitations Related to Problem List (PT) unable to transfer safely;unable to ambulate safely  -AV       Row Name 01/27/25 1500          PT Evaluation Complexity    History, PT Evaluation Complexity 1-2 personal factors and/or comorbidities  -AV     Examination of Body Systems (PT Eval Complexity) total of 4 or more elements  -AV     Clinical Presentation (PT Evaluation Complexity) stable  -AV     Clinical Decision Making (PT Evaluation Complexity) low complexity  -AV     Overall Complexity (PT Evaluation Complexity) low complexity  -AV       Row Name 01/27/25 1500          Therapy Plan Review/Discharge Plan (PT)    Therapy Plan Review (PT) evaluation/treatment results reviewed;patient;spouse/significant other  -AV       Row Name 01/27/25 1500          Physical Therapy Goals    Bed Mobility Goal Selection (PT) bed mobility, PT goal 1  -AV     Transfer Goal Selection (PT) transfer, PT goal 1  -AV     Gait Training Goal Selection (PT) gait training, PT goal 1  -AV       Row Name 01/27/25 1500          Bed Mobility Goal 1 (PT)    Activity/Assistive Device (Bed Mobility Goal 1, PT) sit to supine/supine to sit  -AV     [place]  Level/Cues Needed (Bed Mobility Goal 1, PT) modified independence  -AV     Time Frame (Bed Mobility Goal 1, PT) 10 days  -AV       Row Name 01/27/25 1500          Transfer Goal 1 (PT)    Activity/Assistive Device (Transfer Goal 1, PT) sit-to-stand/stand-to-sit;bed-to-chair/chair-to-bed;walker, rolling  -AV     [place] Level/Cues Needed (Transfer Goal 1, PT) modified independence  -AV     Time Frame (Transfer Goal 1, PT) 10 days  -AV       Row Name 01/27/25 1500          Gait Training Goal 1 (PT)    Activity/Assistive Device (Gait Training Goal 1, PT) gait (walking locomotion);assistive device use;walker, rolling  -AV     [place] Level (Gait Training Goal 1, PT) modified independence  -AV     Distance (Gait Training Goal 1, PT) 120  -AV     Time Frame (Gait Training Goal 1, PT) 10 days  -AV               User Key  (r) = Recorded By, (t) = Taken By, (c) = Cosigned By      Initials Name Provider Type    AV Oc Kraft, NAOMI Physical Therapist                    Physical Therapy Education       Title: PT OT SLP Therapies (In Progress)       Topic: Physical Therapy (In Progress)       Point: Mobility training (Done)       Learning Progress Summary            Patient Acceptance, E,TB, VU by AV at 1/27/2025 1522                      Point: Home exercise program (Not Started)       Learner Progress:  Not documented in this visit.              Point: Body mechanics (Done)       Learning Progress Summary            Patient Acceptance, E,TB, VU by AV at 1/27/2025 1522                      Point: Precautions (Done)       Learning Progress Summary            Patient Acceptance, E,TB, VU by AV at 1/27/2025 1522                                      User Key       Initials Effective Dates Name Provider Type Discipline     06/11/21 -  Oc Kraft, PT Physical Therapist PT                  PT Recommendation and Plan  Anticipated Discharge Disposition (PT): sub acute care setting  Planned Therapy Interventions  (PT): bed mobility training, balance training, gait training, home exercise program, neuromuscular re-education, strengthening, transfer training  Therapy Frequency (PT): daily  Plan of Care Reviewed With: patient, spouse  Progress: no change  Outcome Evaluation: Patient presents with deficits in balance, endurance, transfers, and ambulation. Patient will benefit from skilled PT services to address these mobility deficits and decrease risk of falls.   Outcome Measures       Row Name 01/27/25 1500             How much help from another person do you currently need...    Turning from your back to your side while in flat bed without using bedrails? 3  -AV      Moving from lying on back to sitting on the side of a flat bed without bedrails? 3  -AV      Moving to and from a bed to a chair (including a wheelchair)? 3  -AV      Standing up from a chair using your arms (e.g., wheelchair, bedside chair)? 3  -AV      Climbing 3-5 steps with a railing? 2  -AV      To walk in hospital room? 3  -AV      AM-PAC 6 Clicks Score (PT) 17  -AV         Functional Assessment    Outcome Measure Options AM-PAC 6 Clicks Basic Mobility (PT)  -AV                User Key  (r) = Recorded By, (t) = Taken By, (c) = Cosigned By      Initials Name Provider Type    AV Oc Kraft, NAOMI Physical Therapist                     Time Calculation:    PT Charges       Row Name 01/27/25 1521             Time Calculation    PT Received On 01/27/25  -AV      PT Goal Re-Cert Due Date 02/05/25  -AV         Untimed Charges    PT Eval/Re-eval Minutes 40  -AV         Total Minutes    Untimed Charges Total Minutes 40  -AV       Total Minutes 40  -AV                User Key  (r) = Recorded By, (t) = Taken By, (c) = Cosigned By      Initials Name Provider Type    Oc Shane, PT Physical Therapist                  Therapy Charges for Today       Code Description Service Date Service Provider Modifiers Qty    62622518798 HC PT EVAL LOW COMPLEXITY 3  1/27/2025 Oc Kraft, PT GP 1            PT G-Codes  Outcome Measure Options: AM-PAC 6 Clicks Basic Mobility (PT)  AM-PAC 6 Clicks Score (PT): 17    Oc Kraft, PT  1/27/2025

## 2025-01-27 NOTE — PLAN OF CARE
Goal Outcome Evaluation:  Plan of Care Reviewed With: patient, spouse        Progress: no change  Outcome Evaluation: Patient presents with deficits in balance, endurance, transfers, and ambulation. Patient will benefit from skilled PT services to address these mobility deficits and decrease risk of falls.    Anticipated Discharge Disposition (PT): sub acute care setting

## 2025-01-27 NOTE — SIGNIFICANT NOTE
01/27/25 0851   OTHER   Discipline occupational therapist   Rehab Time/Intention   Session Not Performed patient unavailable for evaluation

## 2025-01-27 NOTE — THERAPY TREATMENT NOTE
Acute Care - Speech Language Pathology   Swallow Treatment Note DEEP Salazar     Patient Name: Colleen Gates  : 1933  MRN: [id number]  Today's Date: 2025               Admit Date: 2025    Visit Dx:     ICD-10-CM ICD-9-CM   1. COVID-19  U07.1 079.89   2. Oropharyngeal dysphagia  R13.12 787.22   3. Dehydration  E86.0 276.51   4. Acute kidney injury  N17.9 584.9   5. Moderate dementia without behavioral disturbance, psychotic disturbance, mood disturbance, or anxiety, unspecified dementia type  F03.B0 294.20   6. Physical deconditioning  R53.81 799.3     Patient Active Problem List   Diagnosis    Fall    Failure to thrive in adult     Past Medical History:   Diagnosis Date    GERD (gastroesophageal reflux disease)      History reviewed. No pertinent surgical history.    SLP Recommendation and Plan            SPEECH PATHOLOGY DYSPHAGIA TREATMENT    Subjective/Behavioral Observations: Awake, cooperative        Day/time of Treatment: 2025        Current Diet: Full liquid, nectar thick        Current Strategies: Small bites and sips, total assist for feeding, frequent double swallow        Treatment received: Focus on dysphagia goals, trials of p.o.        Results of treatment: Nectar thick liquids via controlled straw drink.  Swallows completed with mild delay.  Vocal quality and laryngeal sounds clear with trials x 4 however with increased trials throat clearing observed.  Spoonfed bites of yogurt and cream of wheat.  With initial trials no overt clinical signs or symptoms of aspiration observed however with increasing trials vocal wetness and throat clearing observed.  Concern for pharyngeal weakness/accumulation of residues.        Progress toward goals: Risk of aspiration        Barriers to Achieving goals: Medical status        Plan of care:/changes in plan: Modified barium swallow study will be completed for further investigation of pharyngeal function.  Further diet and compensatory strategy  recommendations pending results of modified barium swallow study.                                                                                      EDUCATION  The patient has been educated in the following areas:   Dysphagia (Swallowing Impairment).                Time Calculation:    Time Calculation- SLP       Row Name 01/27/25 1141             Time Calculation- SLP    SLP Stop Time 0700  -SN      SLP Received On 01/27/25  -SN         Untimed Charges    26003-TT Treatment Swallow Minutes 45  -SN         Total Minutes    Untimed Charges Total Minutes 45  -SN       Total Minutes 45  -SN                User Key  (r) = Recorded By, (t) = Taken By, (c) = Cosigned By      Initials Name Provider Type    Shelly Doan MS-CCC/SLP, ANGIE Speech and Language Pathologist                    Therapy Charges for Today       Code Description Service Date Service Provider Modifiers Qty    80435722974  ST EVAL ORAL PHARYNG SWALLOW 4 1/26/2025 Shelly Calabrese MS-CCC/SLP, ANGIE GN 1    [id number] HC ST TREATMENT SWALLOW 3 1/27/2025 Shelly Calabrese MS-CCC/SLP, ANGIE GN 1                 OTIS Palacios/ANGIE EBTH  1/27/2025

## 2025-01-27 NOTE — CONSULTS
"Nutrition Services    Patient Name: Colleen Gates  YOB: 1933  MRN: [id number]  Admission date: 1/25/2025      CLINICAL NUTRITION ASSESSMENT      Reason for Assessment  MST Score 2+ and Malnutrition Severity Assessment     H&P:  Past Medical History:   Diagnosis Date    GERD (gastroesophageal reflux disease)         Current Problems:   Active Hospital Problems    Diagnosis     **Failure to thrive in adult         Nutrition/Diet History         Narrative   Decreased po intake, with nausea and emesis noted x 1 week. Admission wt of 71 lbs and BMI of 13.1. Full liquid diet (Nectar liquids) is in place, per SLP eval. Advance diet as feasible; recommend Magic Cup BID for additional calories/protein. RD to follow per protocol.     Anthropometrics        Current Height, Weight Height: 157.5 cm (62\")  Weight: 32.5 kg (71 lb 10.4 oz)   Current BMI Body mass index is 13.1 kg/m².   BMI Classification Underweight   % IBW 64% (50.1 kg)    Adjusted Body Weight (ABW)    Weight Hx  Wt Readings from Last 30 Encounters:   01/26/25 0227 32.5 kg (71 lb 10.4 oz)   01/25/25 1848 31.8 kg (70 lb 1.7 oz)   08/12/23 1958 37.1 kg (81 lb 11.2 oz)   12/21/22 1616 39.7 kg (87 lb 8.4 oz)   05/21/19 0000 47.4 kg (104 lb 8 oz)   04/23/19 0000 48.2 kg (106 lb 4 oz)   04/01/19 0000 48.3 kg (106 lb 7 oz)          Wt Change Observation UBW of 80-90 lbs     Estimated/Assessed Needs  Estimated Needs based on: Current Body Weight       Energy Requirements 35-40 kcal/kg   EST Needs (kcal/day) 2976-3714 kcal        Protein Requirements 1.5-2 g/kg    EST Daily Needs (g/day) 48-65 g       Fluid Requirements 30-35 ml/kg     Estimated Needs (mL/day) 975-1138 ml      Labs/Medications         Pertinent Labs Reviewed.   Results from last 7 days   Lab Units 01/25/25 2009   SODIUM mmol/L 143   POTASSIUM mmol/L 4.7   CHLORIDE mmol/L 109*   CO2 mmol/L 23.1   BUN mg/dL 34*   CREATININE mg/dL 1.18*   CALCIUM mg/dL 8.7   BILIRUBIN mg/dL 0.3   ALK PHOS U/L " "58   ALT (SGPT) U/L 7   AST (SGOT) U/L 14   GLUCOSE mg/dL 83     Results from last 7 days   Lab Units 01/25/25 2009 01/25/25  1903   MAGNESIUM mg/dL 2.0  --    HEMOGLOBIN g/dL  --  13.8   HEMATOCRIT %  --  42.8     COVID19   Date Value Ref Range Status   01/25/2025 Detected (C) Not Detected - Ref. Range Final     No results found for: \"HGBA1C\"      Pertinent Medications Reviewed.     Malnutrition Severity Assessment      Patient meets criteria for : Severe Malnutrition  Malnutrition Type (Last 8 Hours)       Malnutrition Severity Assessment       Row Name 01/27/25 1128       Malnutrition Severity Assessment    Malnutrition Type Acute Disease or Injury - Related Malnutrition      Row Name 01/27/25 1128       Insufficient Energy Intake     Insufficient Energy Intake  <75% of est. energy requirement for >7d)      Row Name 01/27/25 1128       Muscle Loss    Clavicle Bone Region Severe - protruding prominent bone      Row Name 01/27/25 1128       Criteria Met (Must meet criteria for severity in at least 2 of these categories: M Wasting, Fat Loss, Fluid, Secondary Signs, Wt. Status, Intake)    Patient meets criteria for  Severe Malnutrition                     Nutrition Diagnosis         Nutrition Dx Problem 1 Severe malnutrition related to inadequate oral Intake as evidenced by decreased appetite. and unintended weight change.     Nutrition Intervention           Current Nutrition Orders & Evaluation of Intake       Current PO Diet Diet: Liquid; Full Liquid; Fluid Consistency: Nectar Thick   Supplement No active supplement orders           Nutrition Intervention/Prescription        Magic Cup BID   Advance diet as feasible (Full Liquids - Nectar)        Medical Nutrition Therapy/Nutrition Education          Learner     Readiness N/A  N/A     Method     Response N/A  N/A     Monitor/Evaluation        Monitor Per protocol, I&O, PO intake, GI status     Nutrition Discharge Plan         Recommend to continue oral nutrition " supplements on discharge.      Electronically signed by:  Hayley Cotto RD  01/27/25 11:29 EST

## 2025-01-28 LAB
ALBUMIN SERPL-MCNC: 3 G/DL (ref 3.5–5.2)
ALBUMIN/GLOB SERPL: 0.9 G/DL
ALP SERPL-CCNC: 62 U/L (ref 39–117)
ALT SERPL W P-5'-P-CCNC: 7 U/L (ref 1–33)
ANION GAP SERPL CALCULATED.3IONS-SCNC: 8.5 MMOL/L (ref 5–15)
AST SERPL-CCNC: 14 U/L (ref 1–32)
BASOPHILS # BLD AUTO: 0.02 10*3/MM3 (ref 0–0.2)
BASOPHILS NFR BLD AUTO: 0.2 % (ref 0–1.5)
BILIRUB SERPL-MCNC: 0.6 MG/DL (ref 0–1.2)
BUN SERPL-MCNC: 14 MG/DL (ref 8–23)
BUN/CREAT SERPL: 16.1 (ref 7–25)
CALCIUM SPEC-SCNC: 8.3 MG/DL (ref 8.2–9.6)
CHLORIDE SERPL-SCNC: 108 MMOL/L (ref 98–107)
CO2 SERPL-SCNC: 22.5 MMOL/L (ref 22–29)
CREAT SERPL-MCNC: 0.87 MG/DL (ref 0.57–1)
DEPRECATED RDW RBC AUTO: 50.2 FL (ref 37–54)
EGFRCR SERPLBLD CKD-EPI 2021: 63 ML/MIN/1.73
EOSINOPHIL # BLD AUTO: 0.03 10*3/MM3 (ref 0–0.4)
EOSINOPHIL NFR BLD AUTO: 0.3 % (ref 0.3–6.2)
ERYTHROCYTE [DISTWIDTH] IN BLOOD BY AUTOMATED COUNT: 15.6 % (ref 12.3–15.4)
GLOBULIN UR ELPH-MCNC: 3.4 GM/DL
GLUCOSE SERPL-MCNC: 89 MG/DL (ref 65–99)
HCT VFR BLD AUTO: 44.8 % (ref 34–46.6)
HGB BLD-MCNC: 14.5 G/DL (ref 12–15.9)
IMM GRANULOCYTES # BLD AUTO: 0.04 10*3/MM3 (ref 0–0.05)
IMM GRANULOCYTES NFR BLD AUTO: 0.5 % (ref 0–0.5)
LYMPHOCYTES # BLD AUTO: 0.95 10*3/MM3 (ref 0.7–3.1)
LYMPHOCYTES NFR BLD AUTO: 11 % (ref 19.6–45.3)
MAGNESIUM SERPL-MCNC: 2.2 MG/DL (ref 1.7–2.3)
MCH RBC QN AUTO: 28.8 PG (ref 26.6–33)
MCHC RBC AUTO-ENTMCNC: 32.4 G/DL (ref 31.5–35.7)
MCV RBC AUTO: 89.1 FL (ref 79–97)
MONOCYTES # BLD AUTO: 0.67 10*3/MM3 (ref 0.1–0.9)
MONOCYTES NFR BLD AUTO: 7.8 % (ref 5–12)
NEUTROPHILS NFR BLD AUTO: 6.91 10*3/MM3 (ref 1.7–7)
NEUTROPHILS NFR BLD AUTO: 80.2 % (ref 42.7–76)
NRBC BLD AUTO-RTO: 0 /100 WBC (ref 0–0.2)
PHOSPHATE SERPL-MCNC: 1.9 MG/DL (ref 2.5–4.5)
PLATELET # BLD AUTO: 201 10*3/MM3 (ref 140–450)
PMV BLD AUTO: 11.3 FL (ref 6–12)
POTASSIUM SERPL-SCNC: 4.2 MMOL/L (ref 3.5–5.2)
PROT SERPL-MCNC: 6.4 G/DL (ref 6–8.5)
RBC # BLD AUTO: 5.03 10*6/MM3 (ref 3.77–5.28)
SODIUM SERPL-SCNC: 139 MMOL/L (ref 136–145)
WBC NRBC COR # BLD AUTO: 8.62 10*3/MM3 (ref 3.4–10.8)

## 2025-01-28 PROCEDURE — 97165 OT EVAL LOW COMPLEX 30 MIN: CPT

## 2025-01-28 PROCEDURE — 99232 SBSQ HOSP IP/OBS MODERATE 35: CPT | Performed by: INTERNAL MEDICINE

## 2025-01-28 PROCEDURE — 80053 COMPREHEN METABOLIC PANEL: CPT

## 2025-01-28 PROCEDURE — 83735 ASSAY OF MAGNESIUM: CPT

## 2025-01-28 PROCEDURE — 85025 COMPLETE CBC W/AUTO DIFF WBC: CPT

## 2025-01-28 PROCEDURE — 84100 ASSAY OF PHOSPHORUS: CPT

## 2025-01-28 PROCEDURE — 25010000002 CEFTRIAXONE PER 250 MG: Performed by: STUDENT IN AN ORGANIZED HEALTH CARE EDUCATION/TRAINING PROGRAM

## 2025-01-28 PROCEDURE — 25010000003 DEXTROSE 5 % SOLUTION

## 2025-01-28 PROCEDURE — 25810000003 LACTATED RINGERS PER 1000 ML: Performed by: STUDENT IN AN ORGANIZED HEALTH CARE EDUCATION/TRAINING PROGRAM

## 2025-01-28 RX ORDER — NYSTATIN 100000 [USP'U]/ML
5 SUSPENSION ORAL 4 TIMES DAILY
Status: DISCONTINUED | OUTPATIENT
Start: 2025-01-28 | End: 2025-01-31 | Stop reason: HOSPADM

## 2025-01-28 RX ADMIN — GUAIFENESIN 400 MG: 100 LIQUID ORAL at 17:41

## 2025-01-28 RX ADMIN — NYSTATIN 500000 UNITS: 100000 SUSPENSION ORAL at 17:41

## 2025-01-28 RX ADMIN — SODIUM CHLORIDE 1000 MG: 9 INJECTION INTRAMUSCULAR; INTRAVENOUS; SUBCUTANEOUS at 20:14

## 2025-01-28 RX ADMIN — ACETAMINOPHEN 650 MG: 325 TABLET ORAL at 09:06

## 2025-01-28 RX ADMIN — NYSTATIN 500000 UNITS: 100000 SUSPENSION ORAL at 20:13

## 2025-01-28 RX ADMIN — GUAIFENESIN 400 MG: 100 LIQUID ORAL at 09:06

## 2025-01-28 RX ADMIN — NYSTATIN 500000 UNITS: 100000 SUSPENSION ORAL at 12:06

## 2025-01-28 RX ADMIN — Medication 10 ML: at 09:07

## 2025-01-28 RX ADMIN — Medication 10 ML: at 20:14

## 2025-01-28 RX ADMIN — SODIUM CHLORIDE, POTASSIUM CHLORIDE, SODIUM LACTATE AND CALCIUM CHLORIDE 50 ML/HR: 600; 310; 30; 20 INJECTION, SOLUTION INTRAVENOUS at 20:23

## 2025-01-28 RX ADMIN — AZITHROMYCIN DIHYDRATE 500 MG: 250 TABLET ORAL at 09:06

## 2025-01-28 RX ADMIN — SODIUM PHOSPHATE, MONOBASIC, MONOHYDRATE AND SODIUM PHOSPHATE, DIBASIC, ANHYDROUS 15 MMOL: 142; 276 INJECTION, SOLUTION INTRAVENOUS at 09:06

## 2025-01-28 RX ADMIN — GUAIFENESIN 400 MG: 100 LIQUID ORAL at 20:13

## 2025-01-28 NOTE — PLAN OF CARE
Goal Outcome Evaluation:  Plan of Care Reviewed With: patient, spouse        Progress: no change  Outcome Evaluation: Remains AxOx2 with intermittent confusion, tolerating minimal intake, frequent c/o oral discomfort, nystatin swish and spit given, some relief, frequent oral care/fluids, c/o back/buttock and leg pain, tylenol given x1 with position shift, effective, up to BSC, case management and palliative care on board.

## 2025-01-28 NOTE — PROGRESS NOTES
[facility]   Hospitalist Progress Note  Date: 2025  Patient Name: Colleen Gates  : 1933  MRN: [id number]  Date of admission: 2025      Subjective   Subjective     Chief Complaint: Weakness    Summary: Colleen Gates is a 89 y.o. female with past medical history of GERD presents to the ED due to generalized weakness and difficulty swallowing.  As per family, patient has been dealing with generalized weakness and cough for the past month.  In the past few days patient been having some nausea vomiting and difficulty swallowing.  Patient was recently discharged on January 10 from [facility] for generalized weakness and dehydration.  In the ED, patient's vitals showed temperature 98.3, heart rate 92 and blood pressure 119/98.  Patient's vitals show troponin 34, creatinine 1.18, sodium 143, calcium 9.4 and hemoglobin 13.8.  COVID-positive.  Patient admitted to floors for further management.     Interval Followup: Seen and examined patient this morning.  Patient remains weak and anxious at times.  Encouraging oral intake.    Objective   Objective     Vitals:   Temp:  [97.6 °F (36.4 °C)-98 °F (36.7 °C)] 97.8 °F (36.6 °C)  Heart Rate:  [61-93] 85  Resp:  [16] 16  BP: (111-166)/(78-94) 152/94    Physical Exam   GEN: No acute distress.  Generalized weakness with intermittent confusion.  Malnourished  HEENT: Moist mucous membranes  LUNGS: Equal chest rise bilaterally  CARDIAC: Regular rate and rhythm  NEURO: Moving all 4 extremities spontaneously  SKIN: No obvious breakdown.         Result Review    Result Review:  I have personally reviewed the results from the time of this admission to 2025 10:30 EST and agree with these findings:  []  Laboratory  []  Microbiology  []  Radiology  []  EKG/Telemetry   []  Cardiology/Vascular   []  Pathology  []  Old records  []  Other:    Assessment & Plan   Assessment / Plan     Assessment:  Intractable nausea and vomiting  Dysphagia  TONYA  Failure to  thrive  COVID-positive  GERD  Essential hypertension  Severe protein calorie malnutrition      Plan:  Continue to monitor in the hospital for workup and management of the above   Continue Rocephin and azithromycin  Speech consulted-- Modified barium swallow study showing poor ability to swallow with some aspiration.  Full liquid diet  CXR reviewed  Continue fluids  Nystatin for oral thrush  Wound care consulted   Electrolyte replacement  PT/OT  CBC, CMP reviewed       Discussed plan with RN.    VTE Prophylaxis:  Mechanical VTE prophylaxis orders are present.        CODE STATUS:   Medical Intervention Limits: No intubation (DNI)  Code Status (Patient has no pulse and is not breathing): No CPR (Do Not Attempt to Resuscitate)  Medical Interventions (Patient has pulse or is breathing): Limited Support      Electronically signed by Carline Foley PA-C, 01/28/25, 10:30 AM EST.     Patient independently seen and evaluated, agree with assessment and plan, above documentation reflects plan put forth during bedside rounds.  More than 51% of the time of this patient encounter was performed by me.     Interval history;  No acute events overnight, patient resting comfortably in bed     GEN: No acute distress  HEENT: Moist mucous membranes  LUNGS: Equal chest rise bilaterally  CARDIAC: Regular rate and rhythm  NEURO: Moving all 4 extremities spontaneously  SKIN: No obvious breakdown     Plan:  Agree with assessment and plan as above  Patient weak and debilitated  Patient with poor ability to swallow and does have some aspiration on modified barium  For now we will keep patient on current diet as x-ray does not seem too bad  Feels that placing a core track in this patient would be more detrimental  Continue to encourage oral intake  Nystatin swish and spit today for thrush  Chest x-ray reviewed  Continue Rocephin and azithromycin for now  Continue to work with speech therapy  CBC, CMP reviewed  Repeat CBC, CMP, mag and Phos in  diego      Electronically signed by Mega Sam MD, 1/28/2025, 10:47 EST.

## 2025-01-28 NOTE — PLAN OF CARE
Goal Outcome Evaluation:  Plan of Care Reviewed With: patient, spouse        Progress: no change (first session for evaluation)  Outcome Evaluation: Patient presents with limitations of balance, strength, cognition/safety awareness and endurance/ activity tolerance which are impacting ADL/ transfer independence. Skilled OT services are indicated to remediate/ compensate for deficits to maximize independence and safety with functional ADL tasks.    Anticipated Discharge Disposition (OT): sub acute care setting

## 2025-01-28 NOTE — PLAN OF CARE
Goal Outcome Evaluation:  Plan of Care Reviewed With: patient, spouse        Progress: improving  Outcome Evaluation: AxOx2 to self and place, tolerating full liquid/nectar thick diet, continues to have productive cough, barium swallow study today pending results, miralax given x1, effective moderate BM, up 1-assist to BSC.

## 2025-01-28 NOTE — THERAPY EVALUATION
Patient Name: Colleen Gates  : 1933    MRN: [id number]                              Today's Date: 2025       Admit Date: 2025    Visit Dx:     ICD-10-CM ICD-9-CM   1. COVID-19  U07.1 079.89   2. Oropharyngeal dysphagia  R13.12 787.22   3. Dehydration  E86.0 276.51   4. Acute kidney injury  N17.9 584.9   5. Moderate dementia without behavioral disturbance, psychotic disturbance, mood disturbance, or anxiety, unspecified dementia type  F03.B0 294.20   6. Physical deconditioning  R53.81 799.3   7. Difficulty walking  R26.2 719.7   8. Decreased activities of daily living (ADL)  Z78.9 V49.89     Patient Active Problem List   Diagnosis    Fall    Failure to thrive in adult    Severe protein-calorie malnutrition     Past Medical History:   Diagnosis Date    GERD (gastroesophageal reflux disease)      History reviewed. No pertinent surgical history.   General Information       Row Name 25 1008          OT Time and Intention    Document Type evaluation  -AV     Mode of Treatment individual therapy;occupational therapy  -AV       Row Name 25 1008          General Information    Patient Profile Reviewed yes  -AV     Prior Level of Function --  Per EMR, patient was independent at home and ambulated with walker. no home O2. further specifics unknown as patient and  both poor historians.  -AV     Existing Precautions/Restrictions fall  DNR. enhanced airborne isolation: COVID+. full liquid/ nectar thick diet.  -AV     Barriers to Rehab none identified  -AV       Row Name 25 1008          Occupational Profile    Reason for Services/Referral (Occupational Profile) Patient is a 89 year old female admitted to [facility] on 25 with weakness. She is currently on 5th floor/ on room air and COVID+.   OT consulted due to recent decline in ADL/transfer independence.  No previous OT services for current condition.  -AV       Row Name 25 1008          Living Environment     People in Home --  , who is also currently hospitalized (in same room)  -AV       Row Name 01/28/25 1008          Home Main Entrance    Number of Stairs, Main Entrance three  -AV       Row Name 01/28/25 1008          Stairs Within Home, Primary    Stairs, Within Home, Primary non-essential interior stairs  -AV       Row Name 01/28/25 1008          Cognition    Orientation Status (Cognition) --  alert, pleasant and cooperative. confused with decreased ability to retain information, impaired attention to task and safety awareness. follows commands with some repeated direction required.  -AV       Row Name 01/28/25 1008          Safety Issues/Impairments Affecting Functional Mobility    Impairments Affecting Function (Mobility) balance;cognition;endurance/activity tolerance;strength  -AV               User Key  (r) = Recorded By, (t) = Taken By, (c) = Cosigned By      Initials Name Provider Type    Nilson Gonsalves OT Occupational Therapist                     Mobility/ADL's       Row Name 01/28/25 1012          Transfers    Comment, (Transfers) CGA/ RW  -AV       Row Name 01/28/25 1012          Activities of Daily Living    BADL Assessment/Intervention --  Patient being fed by PCA upon arrival. Mod assist grooming in bed- able to comb hair min assist with signficant cues for task completion. Mod-max bathing/dressing with setup/ cues.  -AV               User Key  (r) = Recorded By, (t) = Taken By, (c) = Cosigned By      Initials Name Provider Type    Nilson Gonsalves OT Occupational Therapist                   Obj/Interventions       Row Name 01/28/25 1014          Sensory Assessment (Somatosensory)    Sensory Assessment (Somatosensory) UE sensation intact  -AV       Row Name 01/28/25 1014          Vision Assessment/Intervention    Visual Impairment/Limitations WFL  -AV       Row Name 01/28/25 1014          Range of Motion Comprehensive    General Range of Motion bilateral upper extremity ROM WFL  -AV      Comment, General Range of Motion AROM  -AV       Row Name 01/28/25 1014          Strength Comprehensive (MMT)    Comment, General Manual Muscle Testing (MMT) Assessment 4(-)/5 bilateral biceps, triceps and   -AV       Row Name 01/28/25 1014          Motor Skills    Motor Skills functional endurance  -AV     Functional Endurance poor plus  -AV       Row Name 01/28/25 1014          Balance    Comment, Balance CGA/RW  -AV               User Key  (r) = Recorded By, (t) = Taken By, (c) = Cosigned By      Initials Name Provider Type    AV Nilson Arce, OT Occupational Therapist                   Goals/Plan       Row Name 01/28/25 1018          Transfer Goal 1 (OT)    Activity/Assistive Device (Transfer Goal 1, OT) transfers, all;walker, rolling  -AV     [place] Level/Cues Needed (Transfer Goal 1, OT) supervision required;verbal cues required  -AV     Time Frame (Transfer Goal 1, OT) long term goal (LTG);10 days  -AV       Row Name 01/28/25 1018          Bathing Goal 1 (OT)    Activity/Device (Bathing Goal 1, OT) bathing skills, all  -AV     [place] Level/Cues Needed (Bathing Goal 1, OT) supervision required;set-up required;verbal cues required  -AV     Time Frame (Bathing Goal 1, OT) long term goal (LTG);10 days  -AV       Row Name 01/28/25 1018          Dressing Goal 1 (OT)    Activity/Device (Dressing Goal 1, OT) dressing skills, all  -AV     [place]/Cues Needed (Dressing Goal 1, OT) supervision required;set-up required;verbal cues required  -AV     Time Frame (Dressing Goal 1, OT) long term goal (LTG);10 days  -AV       Row Name 01/28/25 1018          Toileting Goal 1 (OT)    Activity/Device (Toileting Goal 1, OT) toileting skills, all;raised toilet seat  -AV     [place] Level/Cues Needed (Toileting Goal 1, OT) supervision required;set-up required;verbal cues required  -AV     Time Frame (Toileting Goal 1, OT) long term goal (LTG);10 days  -AV       Row Name 01/28/25 1018          Grooming  Goal 1 (OT)    Activity/Device (Grooming Goal 1, OT) grooming skills, all  partial stand at sink  -AV     [place] (Grooming Goal 1, OT) supervision required;set-up required;verbal cues required  -AV     Time Frame (Grooming Goal 1, OT) long term goal (LTG);10 days  -AV       Row Name 01/28/25 1018          Strength Goal 1 (OT)    Strength Goal 1 (OT) Patient will demonstrate 4/5 bilateral biceps, triceps and  to increase ADL/ transfer independence.  -AV     Time Frame (Strength Goal 1, OT) long term goal (LTG);10 days  -AV       Row Name 01/28/25 1018          Problem Specific Goal 1 (OT)    Problem Specific Goal 1 (OT) Patient will demonstrate fair endurance/activity tolerance needed to support ADLs.  -AV     Time Frame (Problem Specific Goal 1, OT) long term goal (LTG);10 days  -AV       Row Name 01/28/25 1018          Therapy Assessment/Plan (OT)    Planned Therapy Interventions (OT) activity tolerance training;BADL retraining;functional balance retraining;occupation/activity based interventions;patient/caregiver education/training;strengthening exercise;transfer/mobility retraining  -AV               User Key  (r) = Recorded By, (t) = Taken By, (c) = Cosigned By      Initials Name Provider Type    AV iNlson Arce, OT Occupational Therapist                   Clinical Impression       Row Name 01/28/25 1015          Pain Assessment    Additional Documentation Pain Scale: FACES Pre/Post-Treatment (Group)  -AV       Row Name 01/28/25 1015          Pain Scale: FACES Pre/Post-Treatment    Pain: FACES Scale, Pretreatment 0-->no hurt  -AV     Posttreatment Pain Rating 0-->no hurt  -AV       Row Name 01/28/25 1015          Plan of Care Review    Plan of Care Reviewed With patient;spouse  -AV     Progress no change  first session for evaluation  -AV     Outcome Evaluation Patient presents with limitations of balance, strength, cognition/safety awareness and endurance/ activity tolerance which are impacting  ADL/ transfer independence. Skilled OT services are indicated to remediate/ compensate for deficits to maximize independence and safety with functional ADL tasks.  -AV       Row Name 01/28/25 1015          Therapy Assessment/Plan (OT)    Patient/Family Therapy Goal Statement (OT) none stated  -AV     Rehab Potential (OT) fair  -AV     Criteria for Skilled Therapeutic Interventions Met (OT) yes;meets criteria;skilled treatment is necessary  -AV     Therapy Frequency (OT) 5 times/wk  -AV       Row Name 01/28/25 1015          Therapy Plan Review/Discharge Plan (OT)    Anticipated Discharge Disposition (OT) sub acute care setting  -AV       Row Name 01/28/25 1015          Vital Signs    O2 Delivery Pre Treatment room air  -AV     O2 Delivery Intra Treatment room air  -AV     O2 Delivery Post Treatment room air  -AV       Row Name 01/28/25 1015          Positioning and Restraints    Pre-Treatment Position in bed  -AV     Post Treatment Position bed  -AV     In Bed call light within reach;encouraged to call for assist;exit alarm on  -AV               User Key  (r) = Recorded By, (t) = Taken By, (c) = Cosigned By      Initials Name Provider Type    AV Nilson Arce, OT Occupational Therapist                   Outcome Measures       Row Name 01/28/25 1019          How much help from another is currently needed...    Putting on and taking off regular lower body clothing? 2  -AV     Bathing (including washing, rinsing, and drying) 2  -AV     Toileting (which includes using toilet bed pan or urinal) 2  -AV     Putting on and taking off regular upper body clothing 2  -AV     Taking care of personal grooming (such as brushing teeth) 2  -AV     Eating meals 4  -AV     AM-PAC 6 Clicks Score (OT) 14  -AV       Row Name 01/28/25 0751 01/28/25 0000       How much help from another person do you currently need...    Turning from your back to your side while in flat bed without using bedrails? 3  -NO 3  -WP    Moving from lying on  back to sitting on the side of a flat bed without bedrails? 3  -NO 3  -WP    Moving to and from a bed to a chair (including a wheelchair)? 3  -NO 3  -WP    Standing up from a chair using your arms (e.g., wheelchair, bedside chair)? 3  -NO 3  -WP    Climbing 3-5 steps with a railing? 2  -NO 2  -WP    To walk in hospital room? 3  -NO 3  -WP    AM-PAC 6 Clicks Score (PT) 17  -NO 17  -WP    Highest Level of Mobility Goal 5 --> Static standing  -NO 5 --> Static standing  -WP      Row Name 01/28/25 1019          Functional Assessment    Outcome Measure Options AM-PAC 6 Clicks Daily Activity (OT);Optimal Instrument  -AV       Row Name 01/28/25 1019          Optimal Instrument    Optimal Instrument Optimal - 3  -AV     Bending/Stooping 3  -AV     Standing 2  -AV     Reaching 1  -AV     From the list, choose the 3 activities you would most like to be able to do without any difficulty Bending/stooping;Standing;Reaching  -AV     Total Score Optimal - 3 6  -AV               User Key  (r) = Recorded By, (t) = Taken By, (c) = Cosigned By      Initials Name Provider Type    WP Jenna Alfaro, RN Registered Nurse    Nilson Gonsalves OT Occupational Therapist    NO Kylah Hale, RN Registered Nurse                    Occupational Therapy Education       Title: PT OT SLP Therapies (In Progress)       Topic: Occupational Therapy (Done)       Point: ADL training (Done)       Description:   Instruct learner(s) on proper safety adaptation and remediation techniques during self care or transfers.   Instruct in proper use of assistive devices.                  Learning Progress Summary            Patient Acceptance, E, VU by AV at 1/28/2025 1020                      Point: Home exercise program (Done)       Description:   Instruct learner(s) on appropriate technique for monitoring, assisting and/or progressing therapeutic exercises/activities.                  Learning Progress Summary            Patient Acceptance, E, VU by AV at  1/28/2025 1020                      Point: Precautions (Done)       Description:   Instruct learner(s) on prescribed precautions during self-care and functional transfers.                  Learning Progress Summary            Patient Acceptance, E, VU by AV at 1/28/2025 1020                      Point: Body mechanics (Done)       Description:   Instruct learner(s) on proper positioning and spine alignment during self-care, functional mobility activities and/or exercises.                  Learning Progress Summary            Patient Acceptance, E, VU by AV at 1/28/2025 1020                                      User Key       Initials Effective Dates Name Provider Type Discipline     06/16/21 -  Nilson Arce, OT Occupational Therapist OT                  OT Recommendation and Plan  Planned Therapy Interventions (OT): activity tolerance training, BADL retraining, functional balance retraining, occupation/activity based interventions, patient/caregiver education/training, strengthening exercise, transfer/mobility retraining  Therapy Frequency (OT): 5 times/wk  Plan of Care Review  Plan of Care Reviewed With: patient, spouse  Progress: no change (first session for evaluation)  Outcome Evaluation: Patient presents with limitations of balance, strength, cognition/safety awareness and endurance/ activity tolerance which are impacting ADL/ transfer independence. Skilled OT services are indicated to remediate/ compensate for deficits to maximize independence and safety with functional ADL tasks.     Time Calculation:   Evaluation Complexity (OT)  Review Occupational Profile/Medical/Therapy History Complexity: expanded/moderate complexity  Assessment, Occupational Performance/Identification of Deficit Complexity: 3-5 performance deficits  Clinical Decision Making Complexity (OT): problem focused assessment/low complexity  Overall Complexity of Evaluation (OT): low complexity     Time Calculation- OT       Row Name 01/28/25  1022             Time Calculation- OT    OT Received On 01/28/25  -AV      OT Goal Re-Cert Due Date 02/06/25  -AV         Untimed Charges    OT Eval/Re-eval Minutes 35  -AV         Total Minutes    Untimed Charges Total Minutes 35  -AV       Total Minutes 35  -AV                User Key  (r) = Recorded By, (t) = Taken By, (c) = Cosigned By      Initials Name Provider Type    AV Nilson Arce OT Occupational Therapist                  Therapy Charges for Today       Code Description Service Date Service Provider Modifiers Qty    77815623204 HC OT EVAL LOW COMPLEXITY 3 1/28/2025 Nilson Arce OT GO 1                 Nilson Arce OT  1/28/2025

## 2025-01-29 LAB
ALBUMIN SERPL-MCNC: 2.8 G/DL (ref 3.5–5.2)
ALBUMIN/GLOB SERPL: 0.9 G/DL
ALP SERPL-CCNC: 58 U/L (ref 39–117)
ALT SERPL W P-5'-P-CCNC: 7 U/L (ref 1–33)
ANION GAP SERPL CALCULATED.3IONS-SCNC: 9 MMOL/L (ref 5–15)
AST SERPL-CCNC: 13 U/L (ref 1–32)
BASOPHILS # BLD AUTO: 0.03 10*3/MM3 (ref 0–0.2)
BASOPHILS NFR BLD AUTO: 0.4 % (ref 0–1.5)
BILIRUB SERPL-MCNC: 0.5 MG/DL (ref 0–1.2)
BUN SERPL-MCNC: 9 MG/DL (ref 8–23)
BUN/CREAT SERPL: 10.2 (ref 7–25)
CALCIUM SPEC-SCNC: 8.3 MG/DL (ref 8.2–9.6)
CHLORIDE SERPL-SCNC: 109 MMOL/L (ref 98–107)
CO2 SERPL-SCNC: 24 MMOL/L (ref 22–29)
CREAT SERPL-MCNC: 0.88 MG/DL (ref 0.57–1)
DEPRECATED RDW RBC AUTO: 49.3 FL (ref 37–54)
EGFRCR SERPLBLD CKD-EPI 2021: 62.1 ML/MIN/1.73
EOSINOPHIL # BLD AUTO: 0.02 10*3/MM3 (ref 0–0.4)
EOSINOPHIL NFR BLD AUTO: 0.2 % (ref 0.3–6.2)
ERYTHROCYTE [DISTWIDTH] IN BLOOD BY AUTOMATED COUNT: 15 % (ref 12.3–15.4)
GLOBULIN UR ELPH-MCNC: 3.1 GM/DL
GLUCOSE SERPL-MCNC: 81 MG/DL (ref 65–99)
HCT VFR BLD AUTO: 44.8 % (ref 34–46.6)
HGB BLD-MCNC: 14.2 G/DL (ref 12–15.9)
IMM GRANULOCYTES # BLD AUTO: 0.04 10*3/MM3 (ref 0–0.05)
IMM GRANULOCYTES NFR BLD AUTO: 0.5 % (ref 0–0.5)
LYMPHOCYTES # BLD AUTO: 1.02 10*3/MM3 (ref 0.7–3.1)
LYMPHOCYTES NFR BLD AUTO: 12 % (ref 19.6–45.3)
MAGNESIUM SERPL-MCNC: 1.8 MG/DL (ref 1.7–2.3)
MCH RBC QN AUTO: 28.5 PG (ref 26.6–33)
MCHC RBC AUTO-ENTMCNC: 31.7 G/DL (ref 31.5–35.7)
MCV RBC AUTO: 90 FL (ref 79–97)
MONOCYTES # BLD AUTO: 0.68 10*3/MM3 (ref 0.1–0.9)
MONOCYTES NFR BLD AUTO: 8 % (ref 5–12)
NEUTROPHILS NFR BLD AUTO: 6.72 10*3/MM3 (ref 1.7–7)
NEUTROPHILS NFR BLD AUTO: 78.9 % (ref 42.7–76)
NRBC BLD AUTO-RTO: 0 /100 WBC (ref 0–0.2)
PHOSPHATE SERPL-MCNC: 2.5 MG/DL (ref 2.5–4.5)
PLATELET # BLD AUTO: 211 10*3/MM3 (ref 140–450)
PMV BLD AUTO: 11 FL (ref 6–12)
POTASSIUM SERPL-SCNC: 3.9 MMOL/L (ref 3.5–5.2)
PROT SERPL-MCNC: 5.9 G/DL (ref 6–8.5)
RBC # BLD AUTO: 4.98 10*6/MM3 (ref 3.77–5.28)
SODIUM SERPL-SCNC: 142 MMOL/L (ref 136–145)
WBC NRBC COR # BLD AUTO: 8.51 10*3/MM3 (ref 3.4–10.8)

## 2025-01-29 PROCEDURE — 92526 ORAL FUNCTION THERAPY: CPT

## 2025-01-29 PROCEDURE — 80053 COMPREHEN METABOLIC PANEL: CPT

## 2025-01-29 PROCEDURE — 83735 ASSAY OF MAGNESIUM: CPT

## 2025-01-29 PROCEDURE — 84100 ASSAY OF PHOSPHORUS: CPT

## 2025-01-29 PROCEDURE — 25810000003 LACTATED RINGERS PER 1000 ML: Performed by: INTERNAL MEDICINE

## 2025-01-29 PROCEDURE — 25010000002 CEFTRIAXONE PER 250 MG: Performed by: STUDENT IN AN ORGANIZED HEALTH CARE EDUCATION/TRAINING PROGRAM

## 2025-01-29 PROCEDURE — 99232 SBSQ HOSP IP/OBS MODERATE 35: CPT | Performed by: INTERNAL MEDICINE

## 2025-01-29 PROCEDURE — 85025 COMPLETE CBC W/AUTO DIFF WBC: CPT

## 2025-01-29 RX ORDER — SODIUM CHLORIDE, SODIUM LACTATE, POTASSIUM CHLORIDE, CALCIUM CHLORIDE 600; 310; 30; 20 MG/100ML; MG/100ML; MG/100ML; MG/100ML
50 INJECTION, SOLUTION INTRAVENOUS CONTINUOUS
Status: ACTIVE | OUTPATIENT
Start: 2025-01-29 | End: 2025-01-30

## 2025-01-29 RX ADMIN — SODIUM CHLORIDE, POTASSIUM CHLORIDE, SODIUM LACTATE AND CALCIUM CHLORIDE 50 ML/HR: 600; 310; 30; 20 INJECTION, SOLUTION INTRAVENOUS at 17:50

## 2025-01-29 RX ADMIN — NYSTATIN 500000 UNITS: 100000 SUSPENSION ORAL at 11:25

## 2025-01-29 RX ADMIN — ACETAMINOPHEN 650 MG: 325 TABLET ORAL at 06:32

## 2025-01-29 RX ADMIN — GUAIFENESIN 400 MG: 100 LIQUID ORAL at 09:34

## 2025-01-29 RX ADMIN — Medication 10 ML: at 09:35

## 2025-01-29 RX ADMIN — Medication 10 ML: at 21:54

## 2025-01-29 RX ADMIN — GUAIFENESIN 400 MG: 100 LIQUID ORAL at 17:51

## 2025-01-29 RX ADMIN — SODIUM CHLORIDE 1000 MG: 9 INJECTION INTRAMUSCULAR; INTRAVENOUS; SUBCUTANEOUS at 21:53

## 2025-01-29 RX ADMIN — NYSTATIN 500000 UNITS: 100000 SUSPENSION ORAL at 21:53

## 2025-01-29 RX ADMIN — GUAIFENESIN 400 MG: 100 LIQUID ORAL at 21:53

## 2025-01-29 RX ADMIN — NYSTATIN 500000 UNITS: 100000 SUSPENSION ORAL at 09:34

## 2025-01-29 RX ADMIN — NYSTATIN 500000 UNITS: 100000 SUSPENSION ORAL at 17:52

## 2025-01-29 NOTE — NURSING NOTE
Have called son and left voicemail to try and obtain POA papers and discuss goals of care. Will try and call again later today. Palliative care will continue to follow.        Angie LEAL RN  Palliative Care

## 2025-01-29 NOTE — CONSULTS
Purpose of the visit was to evaluate for: hospice referral/discussion. Spoke with RN and discussed  call son .      Assessment: Mrs. Gates has a past medical history of GERD presents to the ED due to generalized weakness and difficulty swallowing. As per family, patient has been dealing with generalized weakness and cough for the past month. In the past few days patient been having some nausea vomiting and difficulty swallowing. Patient was recently discharged on January 10 from [facility] for generalized weakness and dehydration. In the ED, patient's vitals showed temperature 98.3, heart rate 92 and blood pressure 119/98. Patient's vitals show troponin 34, creatinine 1.18, sodium 143, calcium 9.4 and hemoglobin 13.8. COVID-positive. Patient admitted to floors for further management. Pt and spouse sharing same room. Family reports to primary RN that pt has not been eating. Pt is pleasantly confused and unable to make decisions at this time on her own. Will reach out to son about POA papers. Primary RN reports family dynamics amongst granddaughter (who pt and pt spouse raised) and son. Palliative care will continue to follow.        Recommendations/Plan:  pending clinical course .      Angie LEAL RN  Palliative Care

## 2025-01-29 NOTE — THERAPY TREATMENT NOTE
Acute Care - Speech Language Pathology   Swallow Treatment Note  Marie     Patient Name: Colleen Gates  : 1933  MRN: [id number]  Today's Date: 2025               Admit Date: 2025    Visit Dx:     ICD-10-CM ICD-9-CM   1. COVID-19  U07.1 079.89   2. Oropharyngeal dysphagia  R13.12 787.22   3. Dehydration  E86.0 276.51   4. Acute kidney injury  N17.9 584.9   5. Moderate dementia without behavioral disturbance, psychotic disturbance, mood disturbance, or anxiety, unspecified dementia type  F03.B0 294.20   6. Physical deconditioning  R53.81 799.3   7. Difficulty walking  R26.2 719.7   8. Decreased activities of daily living (ADL)  Z78.9 V49.89     Patient Active Problem List   Diagnosis    Fall    Failure to thrive in adult    Severe protein-calorie malnutrition     Past Medical History:   Diagnosis Date    GERD (gastroesophageal reflux disease)      History reviewed. No pertinent surgical history.    SLP Recommendation and Plan      SPEECH PATHOLOGY DYSPHAGIA TREATMENT    Subjective/Behavioral Observations: Patient is awake.  Upon entrance to room, observed  (who is also admitted) feeding his wife scrambled eggs.  Patient coughing, expelling eggs into emesis bag.  Nursing notified.        Day/time of Treatment: 2025        Current Diet: Spoonfed nectar thick (full liquid)        Current Strategies: Total assist for feeding, spoonfed liquids, 1/4 teaspoon bolus size.  Cued for dry swallow/effortful swallow.          Results of treatment: Suspected aspiration of scrambled eggs as patient exhibiting congested coughing.  Required 5 to 10 minutes of coughing and clearance of scrambled eggs from oral cavity.  Prior to treatment, laryngeal sounds were clear to auscultation.  Clear vocal quality however weak.  Treatment focused on spoonfed nectar thickened liquids and bites of cream of wheat.  Nectar thick liquids by 1/4 teaspoon bolus size.  Delayed swallow completed.  No overt clinical signs  or symptoms of aspiration observed with spoonfed nectar thick trials x 6 however with continued trials, patient with increased weakness, increased swallow delay resulting in wet vocal quality and wet throat clearing.        Progress toward goals: High risk of aspiration, malnutrition and dehydration.  Patient with poor p.o. intake, decreased swallow function due to generalized weakness resulting in high aspiration risk.        Barriers to Achieving goals: Medical status        Plan of care:/changes in plan: Continue current plan of nectar thick, full liquid diet despite risk of aspiration and malnutrition.  Agree that cortrak placement would likely worsen patient's swallow function.  Recommend palliative care consult to discuss goals of care with family.                                                                                                EDUCATION  The patient has been educated in the following areas:   Dysphagia (Swallowing Impairment).                Time Calculation:    Time Calculation- SLP       Row Name 01/29/25 1027             Time Calculation- SLP    SLP Stop Time 0900  -SN      SLP Received On 01/29/25  -SN         Untimed Charges    31310-BG Treatment Swallow Minutes 45  -SN         Total Minutes    Untimed Charges Total Minutes 45  -SN       Total Minutes 45  -SN                User Key  (r) = Recorded By, (t) = Taken By, (c) = Cosigned By      Initials Name Provider Type    Shelly Doan MS-CCC/SLP, ANGIE Speech and Language Pathologist                    Therapy Charges for Today       Code Description Service Date Service Provider Modifiers Qty    89972303798 HC ST TREATMENT SWALLOW 3 1/29/2025 Shelly Calabrese MS-CCC/SLP, ANGIE GN 1                 OTIS [place]/ANGIE BETH  1/29/2025

## 2025-01-29 NOTE — SIGNIFICANT NOTE
01/29/25 1140   Coping/Psychosocial   Observed Emotional State calm;cooperative   Verbalized Emotional State hopefulness   Trust Relationship/Rapport empathic listening provided   Family/Support Persons spouse   Involvement in Care interacting with patient   Additional Documentation Spiritual Care (Group)   Spiritual Care   Use of Spiritual Resources prayer   Spiritual Care Source palliative care   Spiritual Care Follow-Up follow-up, none required as presently assessed   Response to Spiritual Care receptive of support;thanks expressed   Spiritual Care Interventions supportive conversation provided;prayer support provided   Spiritual Care Visit Type initial   Receptivity to Spiritual Care visit welcomed  (Her  is with her as a Pt.)     Duration of Visit: 10 min.

## 2025-01-29 NOTE — PLAN OF CARE
Goal Outcome Evaluation:  Plan of Care Reviewed With: patient           Outcome Evaluation: Patient alert and orient x2, some confusion overnight, easily reoriented. Patient up with assist x1 to BSC. Patient receiving IVF. received Nystatin swish and spit for oral discomfort. Patient turned q2 and barrier cream applied.

## 2025-01-29 NOTE — SIGNIFICANT NOTE
Wound Eval / Progress Noted    DEEP Salazar     Patient Name: Colleen Gates  : 1933  MRN: [id number]  Today's Date: 2025                 Admit Date: 2025    Visit Dx:    ICD-10-CM ICD-9-CM   1. COVID-19  U07.1 079.89   2. Oropharyngeal dysphagia  R13.12 787.22   3. Dehydration  E86.0 276.51   4. Acute kidney injury  N17.9 584.9   5. Moderate dementia without behavioral disturbance, psychotic disturbance, mood disturbance, or anxiety, unspecified dementia type  F03.B0 294.20   6. Physical deconditioning  R53.81 799.3   7. Difficulty walking  R26.2 719.7   8. Decreased activities of daily living (ADL)  Z78.9 V49.89         Failure to thrive in adult    Severe protein-calorie malnutrition        Past Medical History:   Diagnosis Date    GERD (gastroesophageal reflux disease)         History reviewed. No pertinent surgical history.      Physical Assessment:       25 1125   Skin   Skin WDL X;color;temperature;elasticity;moisture;integrity;all   Skin Color/Characteristics redness blanchable;other (see comments)  (all bony prominences. JMP, RN, WCC)   Skin Temperature warm   Skin Moisture dry   Skin Elasticity slow return to original state   Skin Integrity intact       Wound Check / Follow-up:  Patient was seen today for a wound consult. Patient was awake and alert at the time of the assessment; allowed for the visit.    Patient does not have any skin breakdown present at the time of the assessment; she is very thin and does has prominent bony prominences. Bony prominences especially the medial thoracic spine and coccyx with bright blanchable intact redness. Recommending to provide quality diligent skin care and application of moisturizer. Apply blue top barrier creams TID and then apply silicone border dressings to all bony prominences as a preventative. Will order this patient a specialty mattress.     Impression: intact blanchable redness to bony prominences    Short term goals:  regain skin  integrity, skin protection, topical treatment, pressure reduction, moisture prevention, quality skin care and hygiene.    Cheryl Vieira RN    1/29/2025    14:34 EST

## 2025-01-29 NOTE — PROGRESS NOTES
[facility]   Hospitalist Progress Note  Date: 2025  Patient Name: Colleen Gates  : 1933  MRN: [id number]  Date of admission: 2025      Subjective   Subjective     Chief Complaint: Weakness    Summary: Colleen Gates is a 89 y.o. female with past medical history of GERD presents to the ED due to generalized weakness and difficulty swallowing.  As per family, patient has been dealing with generalized weakness and cough for the past month.  In the past few days patient been having some nausea vomiting and difficulty swallowing.  Patient was recently discharged on January 10 from [place] for generalized weakness and dehydration.  In the ED, patient's vitals showed temperature 98.3, heart rate 92 and blood pressure 119/98.  Patient's vitals show troponin 34, creatinine 1.18, sodium 143, calcium 9.4 and hemoglobin 13.8.  COVID-positive.  Patient admitted to floors for further management.  Patient has a difficult social situation as her  is admitted to the hospital and is her primary caretaker, patient has very little reserve and is weak and debilitated, her oral intake is poor, she was evaluated by speech therapy and she did have some silent aspirations however I feel core track would be more detrimental to her swallow at this time and her x-rays have looked okay so far.  Will continue with current diet and monitor closely for any change in respiratory status.  Patient and  may need rehab at discharge as both her functional status are quite poor    Interval Followup: No acute events overnight, patient resting comfortably in bed    Objective   Objective     Vitals:   Temp:  [97.8 °F (36.6 °C)-98.6 °F (37 °C)] 98.6 °F (37 °C)  Heart Rate:  [] 101  Resp:  [16-18] 18  BP: (146-152)/(70-95) 148/95    Physical Exam   GEN: No acute distress.  Generalized weakness with intermittent confusion.  Malnourished  HEENT: Moist mucous membranes  LUNGS: Equal chest rise bilaterally  CARDIAC:  Regular rate and rhythm  NEURO: Moving all 4 extremities spontaneously  SKIN: No obvious breakdown.         Result Review    Result Review:  I have personally reviewed the results from the time of this admission to 1/29/2025 09:09 EST and agree with these findings:  []  Laboratory  []  Microbiology  []  Radiology  []  EKG/Telemetry   []  Cardiology/Vascular   []  Pathology  []  Old records  []  Other:    Assessment & Plan   Assessment / Plan     Assessment:  Intractable nausea and vomiting  Dysphagia  TONYA  Failure to thrive  COVID-positive  Possible pneumonia  GERD  Essential hypertension  Severe protein calorie malnutrition      Plan:  Continue to monitor in the hospital for workup and management of the above   Continue Rocephin and azithromycin x 5 days for empiric treatment  Speech consulted-- Modified barium swallow study showing poor ability to swallow with some aspiration.  Continue current diet as recommended by speech therapy  CXR reviewed  Continue fluids  Nystatin swish and spit for oral thrush  Wound care consulted   Electrolyte replacement  PT/OT  CBC, CMP reviewed   for discharge disposition       Discussed plan with RN.    VTE Prophylaxis:  Mechanical VTE prophylaxis orders are present.        CODE STATUS:   Medical Intervention Limits: No intubation (DNI)  Code Status (Patient has no pulse and is not breathing): No CPR (Do Not Attempt to Resuscitate)  Medical Interventions (Patient has pulse or is breathing): Limited Support      Electronically signed by Mega Sam MD, 1/29/2025, 09:10 EST.

## 2025-01-30 LAB
ALBUMIN SERPL-MCNC: 2.8 G/DL (ref 3.5–5.2)
ALBUMIN/GLOB SERPL: 1 G/DL
ALP SERPL-CCNC: 54 U/L (ref 39–117)
ALT SERPL W P-5'-P-CCNC: 7 U/L (ref 1–33)
ANION GAP SERPL CALCULATED.3IONS-SCNC: 7.6 MMOL/L (ref 5–15)
AST SERPL-CCNC: 12 U/L (ref 1–32)
BASOPHILS # BLD AUTO: 0.04 10*3/MM3 (ref 0–0.2)
BASOPHILS NFR BLD AUTO: 0.6 % (ref 0–1.5)
BILIRUB SERPL-MCNC: 0.4 MG/DL (ref 0–1.2)
BUN SERPL-MCNC: 12 MG/DL (ref 8–23)
BUN/CREAT SERPL: 15 (ref 7–25)
CALCIUM SPEC-SCNC: 8.1 MG/DL (ref 8.2–9.6)
CHLORIDE SERPL-SCNC: 111 MMOL/L (ref 98–107)
CO2 SERPL-SCNC: 22.4 MMOL/L (ref 22–29)
CREAT SERPL-MCNC: 0.8 MG/DL (ref 0.57–1)
DEPRECATED RDW RBC AUTO: 49.1 FL (ref 37–54)
EGFRCR SERPLBLD CKD-EPI 2021: 69.7 ML/MIN/1.73
EOSINOPHIL # BLD AUTO: 0.02 10*3/MM3 (ref 0–0.4)
EOSINOPHIL NFR BLD AUTO: 0.3 % (ref 0.3–6.2)
ERYTHROCYTE [DISTWIDTH] IN BLOOD BY AUTOMATED COUNT: 15 % (ref 12.3–15.4)
GLOBULIN UR ELPH-MCNC: 2.9 GM/DL
GLUCOSE SERPL-MCNC: 90 MG/DL (ref 65–99)
HCT VFR BLD AUTO: 44.2 % (ref 34–46.6)
HGB BLD-MCNC: 14 G/DL (ref 12–15.9)
IMM GRANULOCYTES # BLD AUTO: 0.04 10*3/MM3 (ref 0–0.05)
IMM GRANULOCYTES NFR BLD AUTO: 0.6 % (ref 0–0.5)
LYMPHOCYTES # BLD AUTO: 1.22 10*3/MM3 (ref 0.7–3.1)
LYMPHOCYTES NFR BLD AUTO: 17.3 % (ref 19.6–45.3)
MAGNESIUM SERPL-MCNC: 1.9 MG/DL (ref 1.7–2.3)
MCH RBC QN AUTO: 28.6 PG (ref 26.6–33)
MCHC RBC AUTO-ENTMCNC: 31.7 G/DL (ref 31.5–35.7)
MCV RBC AUTO: 90.4 FL (ref 79–97)
MONOCYTES # BLD AUTO: 0.6 10*3/MM3 (ref 0.1–0.9)
MONOCYTES NFR BLD AUTO: 8.5 % (ref 5–12)
NEUTROPHILS NFR BLD AUTO: 5.13 10*3/MM3 (ref 1.7–7)
NEUTROPHILS NFR BLD AUTO: 72.7 % (ref 42.7–76)
NRBC BLD AUTO-RTO: 0 /100 WBC (ref 0–0.2)
PHOSPHATE SERPL-MCNC: 2 MG/DL (ref 2.5–4.5)
PLATELET # BLD AUTO: 226 10*3/MM3 (ref 140–450)
PMV BLD AUTO: 11 FL (ref 6–12)
POTASSIUM SERPL-SCNC: 3.8 MMOL/L (ref 3.5–5.2)
PROT SERPL-MCNC: 5.7 G/DL (ref 6–8.5)
RBC # BLD AUTO: 4.89 10*6/MM3 (ref 3.77–5.28)
SODIUM SERPL-SCNC: 141 MMOL/L (ref 136–145)
WBC NRBC COR # BLD AUTO: 7.05 10*3/MM3 (ref 3.4–10.8)

## 2025-01-30 PROCEDURE — 85025 COMPLETE CBC W/AUTO DIFF WBC: CPT | Performed by: INTERNAL MEDICINE

## 2025-01-30 PROCEDURE — 83735 ASSAY OF MAGNESIUM: CPT | Performed by: INTERNAL MEDICINE

## 2025-01-30 PROCEDURE — 99232 SBSQ HOSP IP/OBS MODERATE 35: CPT | Performed by: INTERNAL MEDICINE

## 2025-01-30 PROCEDURE — 80053 COMPREHEN METABOLIC PANEL: CPT | Performed by: INTERNAL MEDICINE

## 2025-01-30 PROCEDURE — 25010000003 DEXTROSE 5 % SOLUTION: Performed by: INTERNAL MEDICINE

## 2025-01-30 PROCEDURE — 92526 ORAL FUNCTION THERAPY: CPT

## 2025-01-30 PROCEDURE — 84100 ASSAY OF PHOSPHORUS: CPT | Performed by: INTERNAL MEDICINE

## 2025-01-30 RX ORDER — SODIUM PHOSPHATE,MONO-DIBASIC 19G-7G/118
1 ENEMA (ML) RECTAL ONCE
Status: COMPLETED | OUTPATIENT
Start: 2025-01-30 | End: 2025-01-30

## 2025-01-30 RX ORDER — MONTELUKAST SODIUM 10 MG/1
10 TABLET ORAL NIGHTLY
Status: DISCONTINUED | OUTPATIENT
Start: 2025-01-30 | End: 2025-01-31 | Stop reason: HOSPADM

## 2025-01-30 RX ADMIN — MONTELUKAST 10 MG: 10 TABLET, FILM COATED ORAL at 20:31

## 2025-01-30 RX ADMIN — SODIUM PHOSPHATE, MONOBASIC, MONOHYDRATE AND SODIUM PHOSPHATE, DIBASIC, ANHYDROUS 15 MMOL: 142; 276 INJECTION, SOLUTION INTRAVENOUS at 11:51

## 2025-01-30 RX ADMIN — NYSTATIN 500000 UNITS: 100000 SUSPENSION ORAL at 11:52

## 2025-01-30 RX ADMIN — Medication 10 ML: at 20:31

## 2025-01-30 RX ADMIN — Medication 100 MG: at 10:05

## 2025-01-30 RX ADMIN — PANTOPRAZOLE SODIUM 40 MG: 40 TABLET, DELAYED RELEASE ORAL at 05:07

## 2025-01-30 RX ADMIN — Medication 10 ML: at 09:47

## 2025-01-30 RX ADMIN — NYSTATIN 500000 UNITS: 100000 SUSPENSION ORAL at 20:31

## 2025-01-30 RX ADMIN — SODIUM PHOSPHATE, DIBASIC AND SODIUM PHOSPHATE, MONOBASIC 1 ENEMA: 7; 19 ENEMA RECTAL at 18:45

## 2025-01-30 NOTE — PROGRESS NOTES
[facility]   Hospitalist Progress Note  Date: 2025  Patient Name: Colleen Gates  : 1933  MRN: [id number]  Date of admission: 2025      Subjective   Subjective     Chief Complaint: Weakness    Summary: Colleen Gates is a 89 y.o. female with past medical history of GERD presents to the ED due to generalized weakness and difficulty swallowing.  As per family, patient has been dealing with generalized weakness and cough for the past month.  In the past few days patient been having some nausea vomiting and difficulty swallowing.  Patient was recently discharged on January 10 from [place] for generalized weakness and dehydration.  In the ED, patient's vitals showed temperature 98.3, heart rate 92 and blood pressure 119/98.  Patient's vitals show troponin 34, creatinine 1.18, sodium 143, calcium 9.4 and hemoglobin 13.8.  COVID-positive.  Patient admitted to floors for further management.  Patient has a difficult social situation as her  is admitted to the hospital and is her primary caretaker, patient has very little reserve and is weak and debilitated, her oral intake is poor, she was evaluated by speech therapy and she did have some silent aspirations however I feel core track would be more detrimental to her swallow at this time and her x-rays have looked okay so far.  Will continue with current diet and monitor closely for any change in respiratory status.  Patient and  may need rehab at discharge as both her functional status are quite poor    Interval Followup: No acute events overnight, patient up on commode, no BM yet.  Patient denies any constipation or abdominal pain.  Per son at bedside patient has been straining of stools and no BM for 3 days.  Per son they gave her enema at home.    Objective   Objective     Vitals:   Temp:  [97.3 °F (36.3 °C)-97.8 °F (36.6 °C)] 97.7 °F (36.5 °C)  Heart Rate:  [] 105  Resp:  [16-20] 20  BP: (114-168)/(79-96) 155/83    Physical  Exam   GEN: No acute distress.  Generalized weakness with intermittent confusion.  Malnourished  HEENT: Moist mucous membranes  LUNGS: Equal chest rise bilaterally  CARDIAC: Regular rate and rhythm  NEURO: Moving all 4 extremities spontaneously  SKIN: No obvious breakdown.         Result Review    Result Review:  I have personally reviewed the results from the time of this admission to 1/30/2025 17:58 EST and agree with these findings:  []  Laboratory  []  Microbiology  []  Radiology  []  EKG/Telemetry   []  Cardiology/Vascular   []  Pathology  []  Old records  []  Other:    Assessment & Plan   Assessment / Plan     Assessment:  Intractable nausea and vomiting  Dysphagia and aspiration  TONYA.  Resolved  Failure to thrive  COVID-positive  Possible pneumonia.  Unspecified bacterial pathogen  GERD  Essential hypertension  Severe protein calorie malnutrition.  BMI of 13.1      Plan:  Continue to monitor in the hospital for workup and management of the above   Finished Rocephin and azithromycin x 5 days for empiric pneumonia treatment  Speech consulted-- Modified barium swallow study showing poor ability to swallow with some aspiration.  Continue current diet as recommended by speech therapy.  [place] thick liquid  CXR reviewed  Finished IV fluid  Electrolyte replacement protocol  Nystatin swish and spit for oral thrush  Thiamine  DC Mucinex  Wound care consulted   Electrolyte replacement  Bowel regimen.  Enema as needed  PT/OT  CBC, CMP reviewed  Discussed with palliative care.  Appreciate input   for discharge disposition       Discussed plan with RN and .  Discharge to [facility] when bed available.    VTE Prophylaxis:  Mechanical VTE prophylaxis orders are present.        CODE STATUS:   Medical Intervention Limits: No intubation (DNI)  Code Status (Patient has no pulse and is not breathing): No CPR (Do Not Attempt to Resuscitate)  Medical Interventions (Patient has pulse or is  breathing): Limited Support      Electronically signed by Grant Mendoza MD, 01/30/25, 6:01 PM EST.

## 2025-01-30 NOTE — PLAN OF CARE
Problem: Adult Inpatient Plan of Care  Goal: Plan of Care Review  Outcome: Progressing  Goal: Patient-Specific Goal (Individualized)  Outcome: Progressing  Goal: Absence of Hospital-Acquired Illness or Injury  Outcome: Progressing  Intervention: Identify and Manage Fall Risk  Recent Flowsheet Documentation  Taken 1/30/2025 0915 by Alma Blankenship RN  Safety Promotion/Fall Prevention: safety round/check completed  Intervention: Prevent Skin Injury  Recent Flowsheet Documentation  Taken 1/30/2025 0915 by Alma Blankenship RN  Body Position: supine  Goal: Optimal Comfort and Wellbeing  Outcome: Progressing  Goal: Readiness for Transition of Care  Outcome: Progressing     Problem: Fall Injury Risk  Goal: Absence of Fall and Fall-Related Injury  Outcome: Progressing  Intervention: Promote Injury-Free Environment  Recent Flowsheet Documentation  Taken 1/30/2025 0915 by Alma Blankenship RN  Safety Promotion/Fall Prevention: safety round/check completed     Problem: Skin Injury Risk Increased  Goal: Skin Health and Integrity  Outcome: Progressing  Intervention: Optimize Skin Protection  Recent Flowsheet Documentation  Taken 1/30/2025 0915 by Alma Blankenship RN  Activity Management: up to bedside commode     Problem: Violence Risk or Actual  Goal: Anger and Impulse Control  Outcome: Progressing     Problem: Hospitalized Older Adult  Goal: Optimal Cognitive Function  Outcome: Progressing  Goal: Effective Bowel Elimination  Outcome: Progressing  Goal: Fluid and Electrolyte Balance  Outcome: Progressing  Goal: Improved Oral Intake  Outcome: Progressing  Goal: Adequate Sleep/Rest  Outcome: Progressing  Goal: Effective Urinary Elimination  Outcome: Progressing   Goal Outcome Evaluation:                 Patient denies pain. Palliative care RN met with son this morning. Son requesting discharge to rehab. Waiting on care management to arrange rehab placement.

## 2025-01-30 NOTE — NURSING NOTE
Have made two visits today son not at bedside previously. Discussed with CM who reports son would be coming around 11 and would notify palliative care when present. Met with son and discussed goals and wishes. Son reports that he wants them to go to rehab and then hopefully back home. Discussed pts modified diet and decreased weight. Attempted to educate in multiple ways however son did not seem to comprehend magnitude of pt current weight and new diet. Provided education and tried to discuss other options after rehab at which point son went off talking about how pts spouse who is also admitted in the same room has given money away and he is not sure how much. Tried to redirect conversation but was unable to do so. Was able to complete a [place] form for both pt and spouse. Will have providers sign, scan in and provide copies for son and place original in charts. Palliative care will continue to follow should rehab not be an option will attempt to readdress other options. Primary RN at bedside at start of conversation update provided.      Angie LEAL RN  Palliative Care

## 2025-01-30 NOTE — THERAPY TREATMENT NOTE
Acute Care - Speech Language Pathology   Swallow Treatment Note  Marie     Patient Name: Colleen Gates  : 1933  MRN: [id number]  Today's Date: 2025               Admit Date: 2025    Visit Dx:     ICD-10-CM ICD-9-CM   1. COVID-19  U07.1 079.89   2. Oropharyngeal dysphagia  R13.12 787.22   3. Dehydration  E86.0 276.51   4. Acute kidney injury  N17.9 584.9   5. Moderate dementia without behavioral disturbance, psychotic disturbance, mood disturbance, or anxiety, unspecified dementia type  F03.B0 294.20   6. Physical deconditioning  R53.81 799.3   7. Difficulty walking  R26.2 719.7   8. Decreased activities of daily living (ADL)  Z78.9 V49.89     Patient Active Problem List   Diagnosis    Fall    Failure to thrive in adult    Severe protein-calorie malnutrition     Past Medical History:   Diagnosis Date    GERD (gastroesophageal reflux disease)      History reviewed. No pertinent surgical history.    SLP Recommendation and Plan         SPEECH PATHOLOGY DYSPHAGIA TREATMENT    Subjective/Behavioral Observations: awake cooperative. Patient talkative, asking for something to drink.         Day/time of Treatment:25        Current Diet:full liquid, nectar thick        Treatment received:focused on tolerance of current diet recommendations, trials of pureed.         Results of treatment:Laryngeal sounds clear prior to treatment. Patient assisted with spoon fed nectar thick boost. Delayed swallows completed. Patient performing dry swallow 25% of po with cues. Vocal quality remained clear with spoon fed nectar thick. Patient consuming approximately 100ml. Trials of smooth pureed with patient assisted for self feed. No overt clinical s/s of aspiration with first spoon fed pureed trials x6 however patient becoming fatigued. With fatigue patient having increased difficulty with dry swallow for clearance of pharyngeal residues. Patient exhibiting wet throat clearing and wet cough.         Progress toward  goals:Slow progress. patient is at risk of aspiration, malnutrition and dehydration despite being on altered diet. She began treatment with good tolerance of spoon fed nectar thick liquids. She however fatigues easily during treatment and was not able to tolerate trials of thicker, pureed solids.         Barriers to Achieving goals: medical status        Plan of care:/changes in plan: Unsure of goals of care. Patient is on an altered diet and is at high risk of aspiration, malnutrition and dehydration. She is appropriate for palliative services. If goal is for aggressive rehab, she will likely need alternative feeding method for nutritional support.                                                                                              EDUCATION  The patient has been educated in the following areas:   Dysphagia (Swallowing Impairment).                Time Calculation:    Time Calculation- SLP       Row Name 01/30/25 1326             Time Calculation- SLP    SLP Stop Time 0930  -SN      SLP Received On 01/30/25  -SN         Untimed Charges    85543-EH Treatment Swallow Minutes 60  -SN         Total Minutes    Untimed Charges Total Minutes 60  -SN       Total Minutes 60  -SN                User Key  (r) = Recorded By, (t) = Taken By, (c) = Cosigned By      Initials Name Provider Type    SN Shelly Calabrese MS-CCC/SLP, ANGIE Speech and Language Pathologist                    Therapy Charges for Today       Code Description Service Date Service Provider Modifiers Qty    39287172726 HC ST TREATMENT SWALLOW 3 1/29/2025 Shelly Calabrese MS-CCC/SLP, ANGIE GN 1    [id number] HC ST TREATMENT SWALLOW 4 1/30/2025 Shelly Calabrese MS-CCC/SLP, ANGIE GN 1                 OTIS Palacios/ANGIE BETH  1/30/2025

## 2025-01-30 NOTE — PLAN OF CARE
Goal Outcome Evaluation:           Progress: no change  Outcome Evaluation: Coughs at times after thickened liquids. Able to swallow morning pill with thickened water. Up one assist to bedside commode. César Corley RN

## 2025-01-30 NOTE — PROGRESS NOTES
Respiratory Therapist Broncho-Pulmonary Hygiene Progress Note      Patient Name:  Colleen Gates  YOB: 1933    Colleen Gates meets the qualification for Level 1 of the Bronco-Pulmonary Hygiene Protocol. This was based on my daily patient assessment and includes review of chest x-ray results, cough ability and quality, oxygenation, secretions or risk for secretion development and patient mobility.     Broncho-Pulmonary Hygiene Assessment:    Level of Movement: Low level of mobility  Lethargic uncoorperative    Breath Sounds: Diminished and/or coarse rhonchi    Cough: Strong, effective and/or frequent    Chest X-Ray: Possible signs of consolidation and/or atelectasis or clear.     Sputum Productions: Able to produce small to moderate amount, of moderately thick secretions    History and Physical: None    SpO2 to Oxygen Need: greater than 92% on room air or  less than 3L nasal canula    Current SpO2 is: 97 on room air    Based on this information, I have completed the following interventions: Teach/Instruct patient on cough and deep breathe      Electronically signed by Marielos Taylor RRT, 01/30/25, 9:48 AM EST.

## 2025-01-30 NOTE — PLAN OF CARE
Goal Outcome Evaluation:  Plan of Care Reviewed With: patient        Progress: no change  Outcome Evaluation: Remains AxOx2, with intermittent confusion, voice is growing weaker, tolerating minimal oral intake, encouraging to eat more, up 1 assist to bsc, continues to have productive cough, no c/o pain.

## 2025-01-31 VITALS
BODY MASS INDEX: 13.19 KG/M2 | RESPIRATION RATE: 18 BRPM | TEMPERATURE: 97.8 F | DIASTOLIC BLOOD PRESSURE: 73 MMHG | OXYGEN SATURATION: 97 % | SYSTOLIC BLOOD PRESSURE: 113 MMHG | HEIGHT: 62 IN | HEART RATE: 102 BPM | WEIGHT: 71.65 LBS

## 2025-01-31 PROBLEM — D89.831 CYTOKINE RELEASE SYNDROME, GRADE 1: Status: RESOLVED | Noted: 2025-01-31 | Resolved: 2025-01-31

## 2025-01-31 PROBLEM — D89.831 CYTOKINE RELEASE SYNDROME, GRADE 1: Status: ACTIVE | Noted: 2025-01-31

## 2025-01-31 LAB
ALBUMIN SERPL-MCNC: 2.9 G/DL (ref 3.5–5.2)
ANION GAP SERPL CALCULATED.3IONS-SCNC: 10.9 MMOL/L (ref 5–15)
BUN SERPL-MCNC: 15 MG/DL (ref 8–23)
BUN/CREAT SERPL: 16.7 (ref 7–25)
CALCIUM SPEC-SCNC: 8 MG/DL (ref 8.2–9.6)
CHLORIDE SERPL-SCNC: 111 MMOL/L (ref 98–107)
CO2 SERPL-SCNC: 20.1 MMOL/L (ref 22–29)
CREAT SERPL-MCNC: 0.9 MG/DL (ref 0.57–1)
EGFRCR SERPLBLD CKD-EPI 2021: 60.5 ML/MIN/1.73
GLUCOSE SERPL-MCNC: 94 MG/DL (ref 65–99)
MAGNESIUM SERPL-MCNC: 1.9 MG/DL (ref 1.7–2.3)
PHOSPHATE SERPL-MCNC: 2.5 MG/DL (ref 2.5–4.5)
POTASSIUM SERPL-SCNC: 4 MMOL/L (ref 3.5–5.2)
SODIUM SERPL-SCNC: 142 MMOL/L (ref 136–145)

## 2025-01-31 PROCEDURE — 80069 RENAL FUNCTION PANEL: CPT | Performed by: INTERNAL MEDICINE

## 2025-01-31 PROCEDURE — 99239 HOSP IP/OBS DSCHRG MGMT >30: CPT | Performed by: INTERNAL MEDICINE

## 2025-01-31 PROCEDURE — 83735 ASSAY OF MAGNESIUM: CPT | Performed by: INTERNAL MEDICINE

## 2025-01-31 PROCEDURE — 92526 ORAL FUNCTION THERAPY: CPT

## 2025-01-31 RX ORDER — NYSTATIN 100000 [USP'U]/ML
5 SUSPENSION ORAL 4 TIMES DAILY
Qty: 60 ML | Refills: 0 | Status: SHIPPED | OUTPATIENT
Start: 2025-01-31 | End: 2025-02-05

## 2025-01-31 RX ORDER — LANOLIN ALCOHOL/MO/W.PET/CERES
100 CREAM (GRAM) TOPICAL DAILY
Qty: 30 TABLET | Refills: 0 | Status: SHIPPED | OUTPATIENT
Start: 2025-02-01 | End: 2025-03-03

## 2025-01-31 RX ADMIN — Medication 100 MG: at 08:54

## 2025-01-31 RX ADMIN — NYSTATIN 500000 UNITS: 100000 SUSPENSION ORAL at 12:00

## 2025-01-31 RX ADMIN — PANTOPRAZOLE SODIUM 40 MG: 40 TABLET, DELAYED RELEASE ORAL at 05:12

## 2025-01-31 RX ADMIN — NYSTATIN 500000 UNITS: 100000 SUSPENSION ORAL at 08:54

## 2025-01-31 NOTE — PLAN OF CARE
Goal Outcome Evaluation:           Progress: improving  Outcome Evaluation: Up with standby assistance to bedside commode. No events during shift. César Corley RN

## 2025-01-31 NOTE — PLAN OF CARE
Goal Outcome Evaluation:            VSS. Discharge today to [place].

## 2025-01-31 NOTE — DISCHARGE SUMMARY
[facility]        HOSPITALIST  DISCHARGE SUMMARY    Patient Name: Colleen Gates  : 1933  MRN: [id number]    Date of Admission: 2025  Date of Discharge:  2025  Primary Care Physician: Emerson Arenas APRN    Consults       Date and Time Order Name Status Description    2025 11:31 PM Inpatient Hospitalist Consult              Active and Resolved Hospital Problems:  Active Hospital Problems    Diagnosis POA    **Failure to thrive in adult [R62.7] Yes    Severe protein-calorie malnutrition [E43] Yes      Resolved Hospital Problems    Diagnosis POA    Cytokine release syndrome, grade 1 [D89.831] No     Intractable nausea and vomiting  Dysphagia and aspiration  TONYA.  Resolved  Failure to thrive  COVID-positive  Possible pneumonia.  Unspecified bacterial pathogen  GERD  Essential hypertension  Severe protein calorie malnutrition.  BMI of 13.1     Hospital Course     Hospital Course:  Colleen Gates is a 89 y.o. female  with past medical history of GERD presents to the ED due to generalized weakness and difficulty swallowing.  As per family, patient has been dealing with generalized weakness and cough for the past month.  In the past few days patient been having some nausea vomiting and difficulty swallowing.  Patient was recently discharged on January 10 from [place] for generalized weakness and dehydration.  In the ED, patient's vitals showed temperature 98.3, heart rate 92 and blood pressure 119/98.  Patient's vitals show troponin 34, creatinine 1.18, sodium 143, calcium 9.4 and hemoglobin 13.8.  COVID-positive.  Patient admitted to floors for further management.  Patient has a difficult social situation as her  is admitted to the hospital and is her primary caretaker, patient has very little reserve and is weak and debilitated, her oral intake is poor, she was evaluated by speech therapy and she did have some silent aspirations however I feel core track would be more  detrimental to her swallow at this time and her x-rays have looked okay so far.  Will continue with current diet and monitor closely for any change in respiratory status.  Patient and  may need rehab at discharge as both her functional status are quite poor     Interval Followup: No acute events overnight, patient up on commode, no BM yet.  Patient denies any constipation or abdominal pain.   Patient had regular BM yesterday  Discharge to SNF today    DISCHARGE Follow Up Recommendations for labs and diagnostics: PCP.  Diet remains nectar thick liquids as per speech therapy due to aspiration.      Day of Discharge     Vital Signs:  Temp:  [97.3 °F (36.3 °C)-98 °F (36.7 °C)] 97.8 °F (36.6 °C)  Heart Rate:  [] 102  Resp:  [18-20] 18  BP: (113-176)/(73-87) 113/73    Physical Exam:     GEN: No acute distress.  Generalized weakness with intermittent confusion.  Malnourished  HEENT: Moist mucous membranes  LUNGS: Equal chest rise bilaterally  CARDIAC: Regular rate and rhythm  NEURO: Moving all 4 extremities spontaneously  SKIN: No obvious breakdown.       Discharge Details        Discharge Medications        New Medications        Instructions Start Date   nystatin 100,000 unit/mL suspension  Commonly known as: MYCOSTATIN   500,000 Units, Swish & Spit, 4 Times Daily      thiamine 100 MG tablet  Commonly known as: VITAMIN B1   100 mg, Oral, Daily   Start Date: February 1, 2025            Continue These Medications        Instructions Start Date   acetaminophen 325 MG tablet  Commonly known as: TYLENOL   650 mg, Oral, Every 4 Hours PRN      megestrol acetate 400 MG/10ML suspension oral suspension  Commonly known as: MEGACE   800 mg, Oral, Daily      montelukast 10 MG tablet  Commonly known as: SINGULAIR   10 mg, Oral, Nightly      omeprazole 20 MG capsule  Commonly known as: priLOSEC   20 mg, Daily               No Known Allergies    Discharge Disposition:  Skilled Nursing Facility (DC - External)    Diet:  Diet  Instructions       Diet: Liquid Diets; Full Liquid; Nectar Thick      Discharge Diet: Liquid Diets    Liquid Diet: Full Liquid    Fluid Consistency: Nectar Thick            Discharge Activity:   Activity Instructions       Activity as Tolerated              CODE STATUS:  Code Status and Medical Interventions: No CPR (Do Not Attempt to Resuscitate); Limited Support; No intubation (DNI)   Ordered at: 01/26/25 0223     Medical Intervention Limits:    No intubation (DNI)     Code Status (Patient has no pulse and is not breathing):    No CPR (Do Not Attempt to Resuscitate)     Medical Interventions (Patient has pulse or is breathing):    Limited Support         No future appointments.    Additional Instructions for the Follow-ups that You Need to Schedule       Discharge Follow-up with PCP   As directed       Currently Documented PCP:    Emerson Arenas APRN    PCP Phone Number:    [phone]     Follow Up Details: 2 weeks                Pertinent  and/or Most Recent Results     PROCEDURES:   * Cannot find OR case *     LAB RESULTS:      Lab 01/30/25  0420 01/29/25  0516 01/28/25  0501 01/27/25  1152 01/25/25 2009 01/25/25  1903   WBC 7.05 8.51 8.62 7.89  --  9.46   HEMOGLOBIN 14.0 14.2 14.5 14.7  --  13.8   HEMATOCRIT 44.2 44.8 44.8 45.4  --  42.8   PLATELETS 226 211 201 213  --  231   NEUTROS ABS 5.13 6.72 6.91 6.28  --  7.93*   IMMATURE GRANS (ABS) 0.04 0.04 0.04 0.03  --  0.04   LYMPHS ABS 1.22 1.02 0.95 0.87  --  0.77   MONOS ABS 0.60 0.68 0.67 0.70  --  0.70   EOS ABS 0.02 0.02 0.03 0.00  --  0.00   MCV 90.4 90.0 89.1 88.7  --  89.4   PROCALCITONIN  --   --   --   --  0.07  --          Lab 01/31/25  0531 01/30/25  0420 01/29/25  0516 01/28/25  0501 01/27/25  1152   SODIUM 142 141 142 139 141   POTASSIUM 4.0 3.8 3.9 4.2 4.8   CHLORIDE 111* 111* 109* 108* 109*   CO2 20.1* 22.4 24.0 22.5 21.1*   ANION GAP 10.9 7.6 9.0 8.5 10.9   BUN 15 12 9 14 18   CREATININE 0.90 0.80 0.88 0.87 0.86   EGFR 60.5 69.7 62.1 63.0  63.9   GLUCOSE 94 90 81 89 83   CALCIUM 8.0* 8.1* 8.3 8.3 8.6   MAGNESIUM 1.9 1.9 1.8 2.2 1.9   PHOSPHORUS 2.5 2.0* 2.5 1.9*  --          Lab 01/31/25  0531 01/30/25  0420 01/29/25  0516 01/28/25  0501 01/27/25  1152 01/25/25 2009   TOTAL PROTEIN  --  5.7* 5.9* 6.4 6.5 6.3   ALBUMIN 2.9* 2.8* 2.8* 3.0* 3.0* 3.2*   GLOBULIN  --  2.9 3.1 3.4 3.5 3.1   ALT (SGPT)  --  7 7 7 7 7   AST (SGOT)  --  12 13 14 14 14   BILIRUBIN  --  0.4 0.5 0.6 0.5 0.3   ALK PHOS  --  54 58 62 58 58         Lab 01/26/25  0039 01/25/25 2009   HSTROP T 33* 34*                 Brief Urine Lab Results  (Last result in the past 365 days)        Color   Clarity   Blood   Leuk Est   Nitrite   Protein   CREAT   Urine HCG        01/26/25 0345 Dark Yellow   Clear   Negative   Trace   Negative   100 mg/dL (2+)                 Microbiology Results (last 10 days)       Procedure Component Value - Date/Time    COVID-19, FLU A/B, RSV PCR 1 HR TAT - Swab, Nasopharynx [[id number]]  (Abnormal) Collected: 01/25/25 2215    Lab Status: Final result Specimen: Swab from Nasopharynx Updated: 01/25/25 2306     COVID19 Detected     Influenza A PCR Not Detected     Influenza B PCR Not Detected     RSV, PCR Not Detected    Narrative:      Fact sheet for providers: https://www.fda.gov/media/995006/download    Fact sheet for patients: https://www.fda.gov/media/086096/download    Test performed by PCR.            FL Video Swallow With Speech Single Contrast    Result Date: 1/28/2025  Impression: Impression: 1. Laryngeal penetration with nectar thick liquid barium 2. Tracheal aspiration of thin liquid barium via spoon trial and from residues. 3. Prominent cricopharyngeus muscle Please consult speech pathology report for further details regarding the exam and for dietary recommendations. Report dictated by: Francia Brooks  I have personally reviewed this case and agree with the findings above: Electronically Signed: Ananda Schultz MD  1/28/2025 9:01 AM EST  Workstation  ID: [id number]    XR Chest 1 View    Result Date: 1/25/2025  Impression: No acute infiltrate. Borderline cardiac enlargement.    Portions of this note were completed with a voice recognition program. Electronically Signed: Daniel Dykes MD  1/25/2025 7:48 PM EST  Workstation ID: [id number]    XR Chest 1 View    Result Date: 1/7/2025  Impression: 1. A dense/calcific right midlung nodule, most likely corresponding to granuloma. 2. Bilateral diffuse coarse interstitial lung markings, likely chronic changes. 3. Cardiomegaly. 4. Bilateral prominent hilar vascular markings. Possible pulmonary congestion. Correlate clinically. 5. No acute cardiopulmonary abnormalities are identified.     Results for orders placed during the hospital encounter of 12/21/22    Duplex Venous Lower Extremity - Left CAR    Interpretation Summary    Normal left lower extremity venous duplex scan.      Results for orders placed during the hospital encounter of 12/21/22    Duplex Venous Lower Extremity - Left CAR    Interpretation Summary    Normal left lower extremity venous duplex scan.          Imaging Results (All)       Procedure Component Value Units Date/Time    FL Video Swallow With Speech Single Contrast [[id number]] Collected: 01/28/25 0824     Updated: 01/28/25 0903    Narrative:      FL VIDEO SWALLOW W SPEECH SINGLE-CONTRAST    Date of Exam: 1/27/2025 12:35 PM EST    Indication: concern for aspiration.    Comparison: None available.    Fluoroscopic Time [mm:ss] : 02:42    Number of Images: 9    Technique: Various consistencies of barium (nectar thick liquid barium, applesauce of barium, thin liquid barium, honey thick liquid barium,) were administered to the patient with cine/video imaging.  Imaging assistance was provided to the speech   pathologist and an image was saved.    Findings:  Fluoroscopic examination was performed in conjunction with speech pathology department. Various consistencies of barium were given to assess the swallow  mechanism. Patient experienced tracheal aspiration with thin liquid barium via spoon trial. Patient   experienced laryngeal penetration with nectar thick liquid barium. Patient experienced small amount of tracheal aspiration from residues. Prominent cricopharyngeus muscle without functional obstruction. The exam was discussed in real-time by myself and   the speech pathologist. Please consult speech pathology report for further details regarding exam.      Impression:      Impression:    1. Laryngeal penetration with nectar thick liquid barium  2. Tracheal aspiration of thin liquid barium via spoon trial and from residues.  3. Prominent cricopharyngeus muscle    Please consult speech pathology report for further details regarding the exam and for dietary recommendations.            Report dictated by: Francia Brooks      I have personally reviewed this case and agree with the findings above:    Electronically Signed: Ananda Schultz MD    1/28/2025 9:01 AM EST    Workstation ID: [id number]    XR Chest 1 View [[id number]] Collected: 01/25/25 1947     Updated: 01/25/25 1950    Narrative:      XR CHEST 1 VW    Date of exam: 1/25/2025 7:28 PM EST.    Indication: Weak/dizzy/AMS triage protocol.    Comparison: None available.    FINDINGS:  A single frontal (AP or PA upright portable) chest radiograph reveals borderline cardiac enlargement and no acute infiltrate. No pneumothorax is seen. No pleural effusion. Degenerative changes involve the shoulders and spine. There may be mild   levoscoliosis of the thoracic spine. The thoracic aorta is tortuous and atherosclerotic. Chronic calcified granulomatous disease involves the chest. External artifacts are noted. Mild subsegmental atelectasis and/or linear fibrosis is seen bilaterally,   especially in the lung bases. There are suspected old bilateral rib fractures. There may be several chronic vertebral compression fractures.        Impression:      No acute infiltrate.  Borderline cardiac enlargement.           Portions of this note were completed with a voice recognition program.    Electronically Signed: Daniel Dykes MD    1/25/2025 7:48 PM EST    Workstation ID: [id number]             Labs Pending at Discharge:          Time spent on Discharge including face to face service: 31 minutes  Part of this note may be an electronic transcription/translation of spoken language to printed text using the Dragon Dictation System.     TElectronically signed by Grant Mendoza MD, 01/31/25, 12:16 PM EST.

## 2025-01-31 NOTE — THERAPY TREATMENT NOTE
"Acute Care - Speech Language Pathology   Swallow Treatment Note D[facility]     Patient Name: Colleen Gates  : 1933  MRN: [id number]  Today's Date: 2025               Admit Date: 2025    Visit Dx:     ICD-10-CM ICD-9-CM   1. COVID-19  U07.1 079.89   2. Oropharyngeal dysphagia  R13.12 787.22   3. Dehydration  E86.0 276.51   4. Acute kidney injury  N17.9 584.9   5. Moderate dementia without behavioral disturbance, psychotic disturbance, mood disturbance, or anxiety, unspecified dementia type  F03.B0 294.20   6. Physical deconditioning  R53.81 799.3   7. Difficulty walking  R26.2 719.7   8. Decreased activities of daily living (ADL)  Z78.9 V49.89     Patient Active Problem List   Diagnosis    Fall    Failure to thrive in adult    Severe protein-calorie malnutrition     Past Medical History:   Diagnosis Date    GERD (gastroesophageal reflux disease)      History reviewed. No pertinent surgical history.    SLP Recommendation and Plan         SPEECH PATHOLOGY DYSPHAGIA TREATMENT    Subjective/Behavioral Observations: Patient is awake, cooperative.  Patient states that she \"feels good today\"        Day/time of Treatment: 2025        Current Diet: Nectar thick, full liquid      Results of treatment: Dysphagia goals addressed, tolerance of least restrictive diet and utilization of compensatory strategies.  Patient stating that she wants some \"chocolate drink\".  Nectar thick liquid boost given during treatment.  Single controlled sips of nectar thickened liquid given with patient assisting with self-feeding.  Patient performing double swallow with consistent cueing.  Patient consumed to 40 mL of nectar thick boost with vocal quality and laryngeal sounds clear as patient performing double swallow.  Trials of thicker consistencies of Greek yogurt with patient exhibiting consistent throat clearing, laryngeal wetness.  Patient cued for cough and double swallow which appears to clear at bedside.  Patient " "consumed bites of Greek yogurt.  Patient then stating that she is \"full\", refusing further intake.        Progress toward goals: Slow progress however patient did well at bedside with nectar thick boost.  She does fatigue easily during treatment.        Barriers to Achieving goals: Medical status        Plan of care:/changes in plan: Continue plan.  Patient continues to be at risk of aspiration, malnutrition and dehydration.                                                                                             EDUCATION  The patient has been educated in the following areas:   Dysphagia (Swallowing Impairment).                Time Calculation:    Time Calculation- SLP       Row Name 01/31/25 1052             Time Calculation- SLP    SLP Stop Time 0945  -SN      SLP Received On 01/31/25  -SN         Untimed Charges    [id number] Treatment Swallow Minutes 45  -SN         Total Minutes    Untimed Charges Total Minutes 45  -SN       Total Minutes 45  -SN                User Key  (r) = Recorded By, (t) = Taken By, (c) = Cosigned By      Initials Name Provider Type    SN Shelly Calabrese MS-CCC/SLP, ANGIE Speech and Language Pathologist                    Therapy Charges for Today       Code Description Service Date Service Provider Modifiers Qty    91721100945 HC ST TREATMENT SWALLOW 4 1/30/2025 Shelly Calabrese MS-CCC/SLP, ANGIE GN 1    [id number] HC ST TREATMENT SWALLOW 3 1/31/2025 Shelly Calabrese MS-CCC/SLP, ANGIE GN 1                 OTIS Palacios/SLP, CNT  1/31/2025  "

## 2025-01-31 NOTE — SIGNIFICANT NOTE
01/31/25 1222   Physical Therapy Time and Intention   Session Not Performed other (see comments)  (In process of hospital discharge)

## 2025-02-06 NOTE — PROGRESS NOTES
"Enter Query Response Below      Query Response: Unable to determine.    Electronically signed by Grant Mendoza MD, 25, 4:51 PM EST.               If applicable, please update the problem list.     Patient: Colleen Gates        : 1933  Account: [id number]           Admit Date:         How to Respond to this query:       a. Click New Note     b. Answer query within the yellow box.                c. Update the Problem List, if applicable.      If you have any questions about this query contact me at: [email or web address]     ,     Risk Factors: 89-year-old female with a history of \"GERD\" per H&P.   Clinical Indicators: Presented on  with dysphagia and weakness. H&P also notes, \"She is not taking her medication and she is not eating\" and lists, \"acute kidney injury.\" Discharge summary lists, \"TONYA\" as active and resolved hospital problems. Creatinine 1.18 (), 0.86 (), 0.87 (), 0.88 (), 0.80 ), 0.90 ().  Treatment: Sodium chloride bolus 500ml (), Lactated Ringers Infusion, creatinine monitoring.       After study, was acute kidney injury (TONYA) clinically supported during this admission?    Acute kidney injury (TONYA) was supported with additional clinical indicators:____________  Acute kidney injury (TONYA) was not supported  Other- specify_____________  Unable to determine     TONYA is defined by KDIGO as any of the following:  Increase in creatinine > 0.3 mg/dl within 48 hours or less; or   Increase in creatinine level to > 1.5x baseline (historical or measure), which is known or presumed to have occurred within the prior 7 days   Urine volume <0.5 ml/kg/h for 6 hours.  Reference article: http://www.kdigo.org/clinical_practice_guidelines/pdf/KDIGO%20AKI%20Guideline.pdf (as published in [employer] Supplements, The Official Journal of the International Society of Nephrology, vol. 2, issue 1, 2012.)      By submitting this query, we are merely seeking " further clarification of documentation to accurately reflect all conditions that you are monitoring, evaluating, treating or that extend the hospitalization or utilize additional resources of care. Please utilize your independent clinical judgment when addressing the question(s) above.     This query and your response, once completed, will be entered into the legal medical record.    Sincerely,  Freida Simons RN  Clinical Documentation Integrity Program